# Patient Record
Sex: FEMALE | Race: WHITE | Employment: FULL TIME | URBAN - METROPOLITAN AREA
[De-identification: names, ages, dates, MRNs, and addresses within clinical notes are randomized per-mention and may not be internally consistent; named-entity substitution may affect disease eponyms.]

---

## 2017-02-28 PROBLEM — G25.81 RLS (RESTLESS LEGS SYNDROME): Status: ACTIVE | Noted: 2017-02-28

## 2017-07-19 ENCOUNTER — HOSPITAL ENCOUNTER (OUTPATIENT)
Dept: MAMMOGRAPHY | Age: 52
Discharge: HOME | End: 2017-07-19
Attending: FAMILY MEDICINE
Payer: COMMERCIAL

## 2017-07-19 DIAGNOSIS — Z12.31 ENCOUNTER FOR SCREENING MAMMOGRAM FOR BREAST CANCER: ICD-10-CM

## 2017-07-19 PROCEDURE — 77067 SCR MAMMO BI INCL CAD: CPT | Performed by: FAMILY MEDICINE

## 2017-08-08 ENCOUNTER — OFFICE VISIT (OUTPATIENT)
Dept: FAMILY MEDICINE CLINIC | Facility: CLINIC | Age: 52
End: 2017-08-08

## 2017-08-08 VITALS
WEIGHT: 198 LBS | RESPIRATION RATE: 14 BRPM | SYSTOLIC BLOOD PRESSURE: 118 MMHG | TEMPERATURE: 98 F | HEART RATE: 66 BPM | BODY MASS INDEX: 32 KG/M2 | DIASTOLIC BLOOD PRESSURE: 78 MMHG

## 2017-08-08 DIAGNOSIS — M25.40 JOINT SWELLING: ICD-10-CM

## 2017-08-08 DIAGNOSIS — R23.2 HOT FLASHES: ICD-10-CM

## 2017-08-08 DIAGNOSIS — N91.1 SECONDARY AMENORRHEA: ICD-10-CM

## 2017-08-08 DIAGNOSIS — G25.81 RLS (RESTLESS LEGS SYNDROME): ICD-10-CM

## 2017-08-08 DIAGNOSIS — M05.79 RHEUMATOID ARTHRITIS INVOLVING MULTIPLE SITES WITH POSITIVE RHEUMATOID FACTOR (HCC): Primary | ICD-10-CM

## 2017-08-08 DIAGNOSIS — L65.9 HAIR LOSS: ICD-10-CM

## 2017-08-08 DIAGNOSIS — M25.50 ARTHRALGIA, UNSPECIFIED JOINT: ICD-10-CM

## 2017-08-08 DIAGNOSIS — G44.209 TENSION HEADACHE: ICD-10-CM

## 2017-08-08 DIAGNOSIS — R63.5 WEIGHT GAIN: ICD-10-CM

## 2017-08-08 DIAGNOSIS — M79.671 FOOT PAIN, RIGHT: ICD-10-CM

## 2017-08-08 LAB
25-HYDROXYVITAMIN D (TOTAL): 33.7 NG/ML (ref 30–100)
ALBUMIN SERPL-MCNC: 4 G/DL (ref 3.5–4.8)
ALP LIVER SERPL-CCNC: 79 U/L (ref 41–108)
ALT SERPL-CCNC: 41 U/L (ref 14–54)
ANA SCREEN: POSITIVE
ANTI-THYROGLOBULIN: >500 U/ML (ref ?–60)
ANTI-THYROPEROXIDASE: ABNORMAL U/ML (ref ?–60)
AST SERPL-CCNC: 25 U/L (ref 15–41)
BASOPHILS # BLD AUTO: 0.04 X10(3) UL (ref 0–0.1)
BASOPHILS NFR BLD AUTO: 0.9 %
BILIRUB SERPL-MCNC: 0.5 MG/DL (ref 0.1–2)
BUN BLD-MCNC: 13 MG/DL (ref 8–20)
C-REACTIVE PROTEIN: <0.29 MG/DL (ref ?–1)
CALCIUM BLD-MCNC: 9.2 MG/DL (ref 8.3–10.3)
CENTROMERE AUTOAB: <100 AU/ML (ref ?–100)
CHLORIDE: 107 MMOL/L (ref 101–111)
CO2: 27 MMOL/L (ref 22–32)
CREAT BLD-MCNC: 0.85 MG/DL (ref 0.55–1.02)
DEPRECATED HBV CORE AB SER IA-ACNC: 65.3 NG/ML (ref 10–291)
DSDNA AUTOAB: <100 IU/ML (ref ?–100)
EOSINOPHIL # BLD AUTO: 0.15 X10(3) UL (ref 0–0.3)
EOSINOPHIL NFR BLD AUTO: 3.2 %
ERYTHROCYTE [DISTWIDTH] IN BLOOD BY AUTOMATED COUNT: 12.7 % (ref 11.5–16)
EST. AVERAGE GLUCOSE BLD GHB EST-MCNC: 105 MG/DL (ref 68–126)
ESTRADIOL: 14.4 PG/ML
FREE T4: 1.1 NG/DL (ref 0.9–1.8)
FSH: 55.1 MIU/ML
GLUCOSE BLD-MCNC: 92 MG/DL (ref 70–99)
HAV AB SERPL IA-ACNC: 352 PG/ML (ref 193–986)
HBA1C MFR BLD HPLC: 5.3 % (ref ?–5.7)
HCT VFR BLD AUTO: 41.1 % (ref 34–50)
HGB BLD-MCNC: 13.4 G/DL (ref 12–16)
HISTONE AUTOAB: <100 AU/ML (ref ?–100)
IMMATURE GRANULOCYTE COUNT: 0 X10(3) UL (ref 0–1)
IMMATURE GRANULOCYTE RATIO %: 0 %
JO-1 AUTOAB: <100 AU/ML (ref ?–100)
LH: 32.7 MIU/ML
LYMPHOCYTES # BLD AUTO: 1.39 X10(3) UL (ref 0.9–4)
LYMPHOCYTES NFR BLD AUTO: 29.6 %
M PROTEIN MFR SERPL ELPH: 7.4 G/DL (ref 6.1–8.3)
MCH RBC QN AUTO: 30.4 PG (ref 27–33.2)
MCHC RBC AUTO-ENTMCNC: 32.6 G/DL (ref 31–37)
MCV RBC AUTO: 93.2 FL (ref 81–100)
MONOCYTES # BLD AUTO: 0.36 X10(3) UL (ref 0.1–0.6)
MONOCYTES NFR BLD AUTO: 7.7 %
NEUTROPHIL ABS PRELIM: 2.75 X10 (3) UL (ref 1.3–6.7)
NEUTROPHILS # BLD AUTO: 2.75 X10(3) UL (ref 1.3–6.7)
NEUTROPHILS NFR BLD AUTO: 58.6 %
PLATELET # BLD AUTO: 300 10(3)UL (ref 150–450)
POTASSIUM SERPL-SCNC: 4.1 MMOL/L (ref 3.6–5.1)
RBC # BLD AUTO: 4.41 X10(6)UL (ref 3.8–5.1)
RED CELL DISTRIBUTION WIDTH-SD: 43.7 FL (ref 35.1–46.3)
RHEUMATOID FACT SERPL-ACNC: <10 IU/ML (ref ?–15)
RNP AUTOAB: <100 AU/ML (ref ?–100)
SCL-70 AUTOAB: <100 AU/ML (ref ?–100)
SED RATE-ML: 8 MM/HR (ref 0–25)
SM AUTOAB (SMITH): <100 AU/ML (ref ?–100)
SODIUM SERPL-SCNC: 140 MMOL/L (ref 136–144)
SSA AUTOAB: 241 AU/ML (ref ?–100)
SSB AUTOAB: <100 AU/ML (ref ?–100)
TSI SER-ACNC: 2.14 MIU/ML (ref 0.35–5.5)
WBC # BLD AUTO: 4.7 X10(3) UL (ref 4–13)

## 2017-08-08 PROCEDURE — 86376 MICROSOMAL ANTIBODY EACH: CPT | Performed by: FAMILY MEDICINE

## 2017-08-08 PROCEDURE — 82670 ASSAY OF TOTAL ESTRADIOL: CPT | Performed by: FAMILY MEDICINE

## 2017-08-08 PROCEDURE — 80050 GENERAL HEALTH PANEL: CPT | Performed by: FAMILY MEDICINE

## 2017-08-08 PROCEDURE — 36415 COLL VENOUS BLD VENIPUNCTURE: CPT | Performed by: FAMILY MEDICINE

## 2017-08-08 PROCEDURE — 82306 VITAMIN D 25 HYDROXY: CPT | Performed by: FAMILY MEDICINE

## 2017-08-08 PROCEDURE — 86800 THYROGLOBULIN ANTIBODY: CPT | Performed by: FAMILY MEDICINE

## 2017-08-08 PROCEDURE — 86431 RHEUMATOID FACTOR QUANT: CPT | Performed by: FAMILY MEDICINE

## 2017-08-08 PROCEDURE — 86200 CCP ANTIBODY: CPT | Performed by: FAMILY MEDICINE

## 2017-08-08 PROCEDURE — 86038 ANTINUCLEAR ANTIBODIES: CPT | Performed by: FAMILY MEDICINE

## 2017-08-08 PROCEDURE — 83036 HEMOGLOBIN GLYCOSYLATED A1C: CPT | Performed by: FAMILY MEDICINE

## 2017-08-08 PROCEDURE — 83002 ASSAY OF GONADOTROPIN (LH): CPT | Performed by: FAMILY MEDICINE

## 2017-08-08 PROCEDURE — 86140 C-REACTIVE PROTEIN: CPT | Performed by: FAMILY MEDICINE

## 2017-08-08 PROCEDURE — 82728 ASSAY OF FERRITIN: CPT | Performed by: FAMILY MEDICINE

## 2017-08-08 PROCEDURE — 83001 ASSAY OF GONADOTROPIN (FSH): CPT | Performed by: FAMILY MEDICINE

## 2017-08-08 PROCEDURE — 84439 ASSAY OF FREE THYROXINE: CPT | Performed by: FAMILY MEDICINE

## 2017-08-08 PROCEDURE — 82607 VITAMIN B-12: CPT | Performed by: FAMILY MEDICINE

## 2017-08-08 PROCEDURE — 86235 NUCLEAR ANTIGEN ANTIBODY: CPT | Performed by: FAMILY MEDICINE

## 2017-08-08 PROCEDURE — 99215 OFFICE O/P EST HI 40 MIN: CPT | Performed by: FAMILY MEDICINE

## 2017-08-08 PROCEDURE — 85652 RBC SED RATE AUTOMATED: CPT | Performed by: FAMILY MEDICINE

## 2017-08-08 PROCEDURE — 86225 DNA ANTIBODY NATIVE: CPT | Performed by: FAMILY MEDICINE

## 2017-08-08 NOTE — PROGRESS NOTES
Lukas Damon is a 46year old female. HPI:   Pt is here with multiple concerns, feels like a \"hot mess. \"    She has a continuation of a lot of the same problems she's had over the past year.     She is noting increasing swelling and unexpected weight ga Prescriptions:  RaNITidine HCl 150 MG Oral Tab Take 1 tablet (150 mg total) by mouth 2 (two) times daily.  Disp: 60 tablet Rfl: 1   ROPINIROLE HCL 2 MG Oral Tab TAKE 1 TABLET(2 MG) BY MOUTH EVERY NIGHT Disp: 30 tablet Rfl: 0   TraZODone HCl 50 MG Oral Tab T Comment: occasionally </=7         REVIEW OF SYSTEMS:   GENERAL HEALTH: feels well otherwise  SKIN: denies any unusual skin lesions or rashes  MUSC: see HPI  RESPIRATORY: denies shortness of breath with exertion  CARDIOVASCULAR: denies chest pain on exerti W/ DIFFERENTIAL  -     GERONIMO REFLEXED ANALYTES; Future    Foot pain, right  -? Related to her RA vs gait/shoes; referred to podiatry  -     CBC WITH DIFFERENTIAL WITH PLATELET; Future  -     COMP METABOLIC PANEL (14);  Future  -     FERRITIN; Future  -     HE FERRITIN; Future  -     HEMOGLOBIN A1C; Future  -     THYROID PEROXIDASE AND THYROGLOBULIN ANTIBODIES; Future  -     ASSAY, THYROID STIM HORMONE; Future  -     VITAMIN B12; Future  -     VITAMIN D, 25-HYDROXY; Future  -     FREE T4 (FREE THYROXINE);  Future HEMOGLOBIN A1C; Future  -     THYROID PEROXIDASE AND THYROGLOBULIN ANTIBODIES; Future  -     ASSAY, THYROID STIM HORMONE; Future  -     VITAMIN B12; Future  -     VITAMIN D, 25-HYDROXY; Future  -     FREE T4 (FREE THYROXINE);  Future  -     CYCLIC CITRULLIN indicates understanding of these issues and agrees to the plan. The patient is asked to return pending results.

## 2017-08-08 NOTE — PATIENT INSTRUCTIONS
Patient education: Menopausal hormone therapy (Beyond the Basics)   Author:  Anthony Chmapion MD  Section Editors:  MD Norma Gifford MD  [place] :  Elysa Cooks, MD  Contributor Disclosures  All topics are updated as ne (See \"Patient education: Nonhormonal treatments for menopausal symptoms (Beyond the Basics)\". )  More detailed information about menopausal hormone therapy is available by subscription.  (See \"Treatment of menopausal symptoms with hormone therapy\".)  [facility] increase bone density and treat menopausal symptoms. Women with a uterus who use an estrogen patch must also take a progestin to decrease the risk of uterine cancer. (See 'Types of progestin' below.)  Estrogen pill — There are many types of estrogen pills. second hormone, progestin, minimizes this risk. (See \"Patient education: Endometrial cancer diagnosis and staging (Beyond the Basics)\". )  ? Oral progestins – One commonly prescribed progestin pill is medroxyprogesterone acetate.  Other types of synthetic p prescriber selects the individual hormones and doses, which are then made by a compounding pharmacy. Supporters of this approach claim that bioidentical hormones are safer and have fewer side effects than commercially available preparations.  However, ther that, the risk will continue to go higher if you keep taking estrogen. This is discussed in detail separately. (See \"Menopausal hormone therapy and the risk of breast cancer\". )  Osteoporotic fracture — The risk of breaking a bone at the hip or spine k[place] as hot flashes or vaginal dryness. Most experts agree that hormone therapy is safe for healthy women who have menopausal symptoms. If you decide to take hormones, you should take them for the shortest period of time possible.  Short-term use of hormones (le

## 2017-08-09 PROCEDURE — 83880 ASSAY OF NATRIURETIC PEPTIDE: CPT | Performed by: FAMILY MEDICINE

## 2017-08-10 LAB
BETA NATRIURETIC PEPTIDE: 13 PG/ML (ref 2–99)
CYCLIC CITRULLINATED PEPTIDE: 7 UNITS

## 2017-09-07 ENCOUNTER — TELEPHONE (OUTPATIENT)
Dept: FAMILY MEDICINE CLINIC | Facility: CLINIC | Age: 52
End: 2017-09-07

## 2017-09-07 NOTE — TELEPHONE ENCOUNTER
Provider faxed over a request for last office visit, 08/08/17, and labs,08/08/17. Faxed to them today. Pt has an appt there tomorrow.

## 2017-10-11 PROCEDURE — 88305 TISSUE EXAM BY PATHOLOGIST: CPT | Performed by: INTERNAL MEDICINE

## 2017-10-12 ENCOUNTER — MED REC SCAN ONLY (OUTPATIENT)
Dept: FAMILY MEDICINE CLINIC | Facility: CLINIC | Age: 52
End: 2017-10-12

## 2017-11-25 ENCOUNTER — TELEPHONE (OUTPATIENT)
Dept: FAMILY MEDICINE CLINIC | Facility: CLINIC | Age: 52
End: 2017-11-25

## 2017-11-25 NOTE — TELEPHONE ENCOUNTER
To [address], patient would like to have the estrogen patch sent to [facility].

## 2017-11-25 NOTE — TELEPHONE ENCOUNTER
Patient discussed going on the estrogen patch with Dr Treasure Mary via Email. Patient would like to start this today as she wants to have a couple of days on it before she goes back to work on Monday. Can one of the other MDs order this for her?     ETHEL IN

## 2018-01-04 ENCOUNTER — HOSPITAL ENCOUNTER (OUTPATIENT)
Dept: ULTRASOUND IMAGING | Facility: HOSPITAL | Age: 53
Discharge: HOME | End: 2018-01-04
Attending: INTERNAL MEDICINE
Payer: COMMERCIAL

## 2018-01-04 ENCOUNTER — LAB ENCOUNTER (OUTPATIENT)
Dept: LAB | Facility: HOSPITAL | Age: 53
End: 2018-01-04
Attending: INTERNAL MEDICINE
Payer: COMMERCIAL

## 2018-01-04 DIAGNOSIS — R22.42 FOOT MASS, LEFT: ICD-10-CM

## 2018-01-04 DIAGNOSIS — M35.00 SJOGREN'S SYNDROME (HCC): Primary | ICD-10-CM

## 2018-01-04 LAB
C-REACTIVE PROTEIN: 0.33 MG/DL (ref ?–1)
FREE T4: 0.9 NG/DL (ref 0.9–1.8)
SED RATE-ML: 7 MM/HR (ref 0–25)
T3FREE SERPL-MCNC: 2.52 PG/ML (ref 2.3–4.2)
TSI SER-ACNC: 2.05 MIU/ML (ref 0.35–5.5)

## 2018-01-04 PROCEDURE — 85652 RBC SED RATE AUTOMATED: CPT

## 2018-01-04 PROCEDURE — 84481 FREE ASSAY (FT-3): CPT

## 2018-01-04 PROCEDURE — 84439 ASSAY OF FREE THYROXINE: CPT

## 2018-01-04 PROCEDURE — 86140 C-REACTIVE PROTEIN: CPT

## 2018-01-04 PROCEDURE — 36415 COLL VENOUS BLD VENIPUNCTURE: CPT

## 2018-01-04 PROCEDURE — 76882 US LMTD JT/FCL EVL NVASC XTR: CPT | Performed by: INTERNAL MEDICINE

## 2018-01-04 PROCEDURE — 84443 ASSAY THYROID STIM HORMONE: CPT

## 2018-01-05 ENCOUNTER — TELEPHONE (OUTPATIENT)
Dept: FAMILY MEDICINE CLINIC | Facility: CLINIC | Age: 53
End: 2018-01-05

## 2018-01-05 NOTE — TELEPHONE ENCOUNTER
Spoke with the pt and advised of the recommendations and advised to bring a copy of the disc of the exam to her appt- she states that she doesn't have them- advised that I will have the rad tech make this for her and she can pick it up tomorrow or Monday-

## 2018-01-05 NOTE — TELEPHONE ENCOUNTER
Rheumalogist advised that soft tissue would need to be removed. Can [address] recommend foot surgeon?

## 2018-01-05 NOTE — TELEPHONE ENCOUNTER
Dr Elen Blanco and Mary Lubin and [address]  [address] [facility], [address]  [place], [address]  [phone] fax    Dr. Keke Roque, [address] [facility]  [address] [address] [facility], [address]  Phone:

## 2018-01-22 ENCOUNTER — MED REC SCAN ONLY (OUTPATIENT)
Dept: FAMILY MEDICINE CLINIC | Facility: CLINIC | Age: 53
End: 2018-01-22

## 2018-02-05 ENCOUNTER — TELEPHONE (OUTPATIENT)
Dept: FAMILY MEDICINE CLINIC | Facility: CLINIC | Age: 53
End: 2018-02-05

## 2018-03-05 RX ORDER — ESTRADIOL 0.5 MG/1
TABLET ORAL
Qty: 30 TABLET | Refills: 6 | Status: SHIPPED | OUTPATIENT
Start: 2018-03-05 | End: 2020-09-15

## 2018-03-05 NOTE — TELEPHONE ENCOUNTER
Last refills -   Estradiol - 1/3/18 - #30 with 1 refill  Progesterone - 1/3/18 - #30 with 1 refill  Last office visit- 8/8/17

## 2018-04-08 ENCOUNTER — CHARTING TRANS (OUTPATIENT)
Dept: OTHER | Age: 53
End: 2018-04-08

## 2018-04-19 ENCOUNTER — OFFICE VISIT (OUTPATIENT)
Dept: FAMILY MEDICINE CLINIC | Facility: CLINIC | Age: 53
End: 2018-04-19

## 2018-04-19 VITALS
RESPIRATION RATE: 18 BRPM | HEART RATE: 63 BPM | TEMPERATURE: 98 F | DIASTOLIC BLOOD PRESSURE: 56 MMHG | OXYGEN SATURATION: 98 % | SYSTOLIC BLOOD PRESSURE: 108 MMHG

## 2018-04-19 DIAGNOSIS — J30.2 SEASONAL ALLERGIC RHINITIS, UNSPECIFIED TRIGGER: Primary | ICD-10-CM

## 2018-04-19 DIAGNOSIS — J32.4 PANSINUSITIS, UNSPECIFIED CHRONICITY: ICD-10-CM

## 2018-04-19 PROCEDURE — 99213 OFFICE O/P EST LOW 20 MIN: CPT | Performed by: NURSE PRACTITIONER

## 2018-04-19 RX ORDER — METHYLPREDNISOLONE 4 MG/1
TABLET ORAL
Qty: 1 KIT | Refills: 0 | Status: SHIPPED | OUTPATIENT
Start: 2018-04-19 | End: 2018-05-30 | Stop reason: ALTCHOICE

## 2018-04-19 RX ORDER — DOXYCYCLINE HYCLATE 100 MG
100 TABLET ORAL 2 TIMES DAILY
Qty: 20 TABLET | Refills: 0 | Status: SHIPPED | OUTPATIENT
Start: 2018-04-19 | End: 2018-04-29

## 2018-04-19 NOTE — PROGRESS NOTES
CHIEF COMPLAINT:   Patient presents with:  Sinus Problem      HPI:   Claudine Romo is a 46year old female who presents for cold/runny nose for  3  weeks. Seen about 11/12 days ago at other wic and dx with sinus infection.  Given augmentin for sinus in omega-3 fatty acids 1000 MG Oral Cap Take 1,000 mg by mouth daily. Disp:  Rfl:    Ibuprofen (ADVIL) 200 MG Oral Cap Take 400 mg by mouth 3 (three) times daily as needed.  Disp:  Rfl:       Past Medical History:   Diagnosis Date   • Cancer ([facility] [address]     cancerou Comment: occasionally </=7        REVIEW OF SYSTEMS:   GENERAL:  denies diminished appetite  SKIN: no rashes or abnormal skin lesions  HEENT: See HPI.     LUNGS: denies shortness of breath or wheezing, See HPI  CARDIOVASCULAR: denies chest pain or palpit PLAN: Discussed with patient symptoms sound likely allergy component. Switch flonase to night, start steroids, if improvement in 2-3 days continue course, if no improvement then start abx for secondary sinus infection. Meds as below.   Comfort care instruc ? Use allergy-proof filters for air conditioners and furnaces. Follow product maker's instructions for maintaining and replacing filters. ? If you can, replace tvek-of-pzwj carpets with wood, tile, or linoleum floors.  This is especially important in the b ? Use air conditioning instead of opening the windows in your home or car. In the car, choose the setting to recirculate the air, so less pollen gets in.  ?[employer] someone else do yardwork, if possible.   ? Change clothes in a mudroom when you get home if you · An expectorant containing guaifenesin may help thin the mucus and promote drainage from the sinuses. · Over-the-counter decongestants may be used unless a similar medicine was prescribed.  Nasal sprays work the fastest. Use one that contains phenylephrin © 4714-8847 [employer] [address]. [address] [facility] [place]. All rights reserved. This information is not intended as a substitute for professional medical care. Always follow your healthcare professional's instructions.

## 2018-04-19 NOTE — PATIENT INSTRUCTIONS
Controlling Asthma Triggers: Allergens  For many people with lung problems such as asthma or COPD, inhaling allergens leads to inflamed airways. Allergens also cause other types of reactions in some people.  For example, a runny nose, itchy, watery eyes, Mold grows in damp places, such as bathrooms, basements, and closets. It can grow anywhere flooding or a fire has caused water damage. Mold can live behind the walls if there has been water damage. ? Clean damp areas weekly to prevent mold growth.  This in The sinuses are air-filled spaces within the bones of the face. They connect to the inside of the nose. Sinusitis is an inflammation of the tissue lining the sinus cavity.  Sinus inflammation can occur during a cold. It can also be due to allergies to polle · Use acetaminophen or ibuprofen to control pain, unless another pain medicine was prescribed. (If you have chronic liver or kidney disease or ever had a stomach ulcer, talk with your doctor before using these medicines.  Aspirin should never be used in any

## 2018-05-30 ENCOUNTER — OFFICE VISIT (OUTPATIENT)
Dept: FAMILY MEDICINE CLINIC | Facility: CLINIC | Age: 53
End: 2018-05-30

## 2018-05-30 ENCOUNTER — TELEPHONE (OUTPATIENT)
Dept: FAMILY MEDICINE CLINIC | Facility: CLINIC | Age: 53
End: 2018-05-30

## 2018-05-30 VITALS
DIASTOLIC BLOOD PRESSURE: 64 MMHG | BODY MASS INDEX: 33.11 KG/M2 | RESPIRATION RATE: 16 BRPM | HEIGHT: 66 IN | HEART RATE: 64 BPM | WEIGHT: 206 LBS | TEMPERATURE: 98 F | SYSTOLIC BLOOD PRESSURE: 110 MMHG

## 2018-05-30 DIAGNOSIS — R22.1 LOCALIZED SWELLING, MASS AND LUMP, NECK: ICD-10-CM

## 2018-05-30 DIAGNOSIS — M21.622 TAILOR'S BUNION OF LEFT FOOT: Primary | ICD-10-CM

## 2018-05-30 DIAGNOSIS — M67.472 GANGLION CYST OF LEFT FOOT: ICD-10-CM

## 2018-05-30 DIAGNOSIS — Z01.818 PRE-OP EVALUATION: ICD-10-CM

## 2018-05-30 DIAGNOSIS — M35.00 SJOGREN'S SYNDROME WITHOUT EXTRAGLANDULAR INVOLVEMENT (HCC): ICD-10-CM

## 2018-05-30 DIAGNOSIS — R19.04 ABDOMINAL SWELLING, LLQ: ICD-10-CM

## 2018-05-30 DIAGNOSIS — Z91.09 SENSITIVITY TO SUNLIGHT: ICD-10-CM

## 2018-05-30 PROCEDURE — 99244 OFF/OP CNSLTJ NEW/EST MOD 40: CPT | Performed by: FAMILY MEDICINE

## 2018-05-30 RX ORDER — OMEPRAZOLE 20 MG/1
40 TABLET, DELAYED RELEASE ORAL DAILY
COMMUNITY
End: 2018-07-13

## 2018-05-30 RX ORDER — FLUTICASONE PROPIONATE 50 MCG
1 SPRAY, SUSPENSION (ML) NASAL AS NEEDED
Refills: 0 | COMMUNITY
Start: 2018-04-08 | End: 2021-04-01

## 2018-05-31 ENCOUNTER — TELEPHONE (OUTPATIENT)
Dept: FAMILY MEDICINE CLINIC | Facility: CLINIC | Age: 53
End: 2018-05-31

## 2018-05-31 NOTE — H&P
Heide Juarez is a 46year old female who presents for a pre-operative physical exam and clearance requested by her surgeon.  Patient is to have L 5th metatarsal bunionectomy with possible screw fixation and ganglion cyst excision, to be done by Dr. Milka Claire COLONOSCOPY, POSSIBLE BIOPSY,                POSSIBLE POLYPECTOMY 73097;  Surgeon: Frida Mendoza MD;  Location: [facility]  10/11/2017: COLONOSCOPY N/A      Comment: Procedure: COLONOSCOPY, POSSIBLE BIOPSY,                POSSIBLE POLYPECT sensitive this year so far)  EYES:denies blurred vision or double vision  HEENT: denies nasal congestion, sinus pain or ST  LUNGS: denies shortness of breath with exertion  CARDIOVASCULAR: denies chest pain on exertion  GI: denies abdominal pain,denies hea Pt's medical conditions are optimally managed for upcoming proposed surgery.      Regarding pt's new concerns today we discussed meds that could make he rmore sun sensitive (likely the plaquenil) and to be vigilant with coveringup/sunscreen; d/w pt the rig

## 2018-05-31 NOTE — TELEPHONE ENCOUNTER
----- Message from Nacho Jones MD sent at 5/30/2018  7:50 PM CDT -----  Regarding: pre op  Please send H&P along with pre-op testing to surgeon (papers in bernard's file folder on back desk) thanks

## 2018-06-15 ENCOUNTER — MED REC SCAN ONLY (OUTPATIENT)
Dept: FAMILY MEDICINE CLINIC | Facility: CLINIC | Age: 53
End: 2018-06-15

## 2018-07-13 DIAGNOSIS — K21.00 GERD WITH ESOPHAGITIS: ICD-10-CM

## 2018-07-13 RX ORDER — OMEPRAZOLE 20 MG/1
20 CAPSULE, DELAYED RELEASE ORAL
Qty: 30 CAPSULE | Refills: 9 | Status: SHIPPED | OUTPATIENT
Start: 2018-07-13 | End: 2020-09-15

## 2018-07-20 ENCOUNTER — TELEPHONE (OUTPATIENT)
Dept: FAMILY MEDICINE CLINIC | Facility: CLINIC | Age: 53
End: 2018-07-20

## 2018-07-20 NOTE — TELEPHONE ENCOUNTER
I wouldn't worry about imaging now, I'd meet with surgeon first (they may not need imaging), I don't recall who did her hernia surgery before, but could meet with that person, or if that person ont available or around I rec Dr. Corrine Fernandez  [phone]

## 2018-07-20 NOTE — TELEPHONE ENCOUNTER
Spoke with the pt and advised no need to do the imaging- gave her the names and numbers to Dr. Edda Lovell and Dr. Soniya Stevenson. She tells me that Dr. Angel Luis Calhoun did her mesh surgery previously.   She is going to talk with her spouse and make a decision and who she wants t

## 2018-07-20 NOTE — TELEPHONE ENCOUNTER
Pt wondering if she should  Have s scan of her \"stomach bulge\" and if [address] would have a recommendation for specialist.

## 2018-08-14 ENCOUNTER — OFFICE VISIT (OUTPATIENT)
Dept: SURGERY | Facility: CLINIC | Age: 53
End: 2018-08-14
Payer: COMMERCIAL

## 2018-08-14 VITALS
TEMPERATURE: 99 F | SYSTOLIC BLOOD PRESSURE: 106 MMHG | BODY MASS INDEX: 33.11 KG/M2 | HEART RATE: 70 BPM | DIASTOLIC BLOOD PRESSURE: 72 MMHG | HEIGHT: 66 IN | WEIGHT: 206 LBS

## 2018-08-14 DIAGNOSIS — R10.31 RIGHT LOWER QUADRANT ABDOMINAL PAIN: Primary | ICD-10-CM

## 2018-08-14 PROCEDURE — 99243 OFF/OP CNSLTJ NEW/EST LOW 30: CPT | Performed by: SURGERY

## 2018-08-14 NOTE — H&P
New Patient Visit Note       Active Problems      No diagnosis found.     Chief Complaint   Patient presents with:  Consult: HAD COLON resection and then had hernia repair with mesh and now one side of incision buldges out, at times gets hard and is painful family history and social history have been reviewed by me today.     Family History   Problem Relation Age of Onset   • Other [OTHER] Sister      RA   • Other [OTHER] Mother      RA   • Cancer Maternal Aunt      great aunt breast cancer   • Breast Cancer M multivitamin Oral Tab Take 1 tablet by mouth daily. Disp:  Rfl:    omega-3 fatty acids 1000 MG Oral Cap Take 1,000 mg by mouth daily. Disp:  Rfl:          Review of Systems  The Review of Systems has been reviewed by me during today.   Review of Systems deficits. Psychiatric. Normal mood and appropriate affect. Skin. No jaundice. Normal skin turgor. No dominant lesions noted. Lymphatic. No cervical, supraclavicular, or inguinal lymphadenopathy.           Assessment   Asymmetry of the abdominal wall

## 2018-11-01 VITALS
WEIGHT: 190 LBS | TEMPERATURE: 98 F | OXYGEN SATURATION: 98 % | HEART RATE: 78 BPM | BODY MASS INDEX: 30.53 KG/M2 | HEIGHT: 66 IN | RESPIRATION RATE: 18 BRPM

## 2018-11-11 ENCOUNTER — OFFICE VISIT (OUTPATIENT)
Dept: FAMILY MEDICINE CLINIC | Facility: CLINIC | Age: 53
End: 2018-11-11
Payer: COMMERCIAL

## 2018-11-11 VITALS
HEART RATE: 75 BPM | HEIGHT: 66 IN | BODY MASS INDEX: 32.14 KG/M2 | WEIGHT: 200 LBS | TEMPERATURE: 98 F | OXYGEN SATURATION: 98 % | DIASTOLIC BLOOD PRESSURE: 72 MMHG | RESPIRATION RATE: 16 BRPM | SYSTOLIC BLOOD PRESSURE: 112 MMHG

## 2018-11-11 DIAGNOSIS — J01.00 ACUTE NON-RECURRENT MAXILLARY SINUSITIS: Primary | ICD-10-CM

## 2018-11-11 DIAGNOSIS — Z23 NEED FOR VACCINATION: ICD-10-CM

## 2018-11-11 PROCEDURE — 99213 OFFICE O/P EST LOW 20 MIN: CPT | Performed by: NURSE PRACTITIONER

## 2018-11-11 PROCEDURE — 90471 IMMUNIZATION ADMIN: CPT | Performed by: NURSE PRACTITIONER

## 2018-11-11 PROCEDURE — 90686 IIV4 VACC NO PRSV 0.5 ML IM: CPT | Performed by: NURSE PRACTITIONER

## 2018-11-11 RX ORDER — AMOXICILLIN AND CLAVULANATE POTASSIUM 875; 125 MG/1; MG/1
1 TABLET, FILM COATED ORAL 2 TIMES DAILY
Qty: 20 TABLET | Refills: 0 | Status: SHIPPED | OUTPATIENT
Start: 2018-11-11 | End: 2018-11-21

## 2018-11-11 NOTE — PROGRESS NOTES
CHIEF COMPLAINT:   Patient presents with:  Ear Pain: bilateral, pain and pressure - x2 days      HPI:   Hakeem Caicedo is a 48year old female who presents to clinic today with complaints of bilat ear pressure. Has had for 2  days.  Pain is described as Use      Smoking status: Former Smoker        Years: 1.00        Types: Cigarettes        Quit date: 2009        Years since quittin.8      Smokeless tobacco: Never Used      Tobacco comment: 1 pack a week     Alcohol use:  Yes      Alcohol/week: 0. Risk and benefits of medication discussed. If antibiotics prescribed, stressed importance of completing full course of antibiotic. Acetaminophen or NSAID prn pain.       Follow up with PCP if s/sx worsen, do not begin to improve in 3 days, or

## 2018-11-11 NOTE — PATIENT INSTRUCTIONS
Use Advil cold and sinus from behind the pharmacy counter  Use Nyquil at bedtime  Prop yourself up at bedtime  Use the antibiotic for sinus infection

## 2019-07-16 ENCOUNTER — TELEPHONE (OUTPATIENT)
Dept: FAMILY MEDICINE CLINIC | Facility: CLINIC | Age: 54
End: 2019-07-16

## 2019-07-16 DIAGNOSIS — Z12.31 BREAST CANCER SCREENING BY MAMMOGRAM: Primary | ICD-10-CM

## 2019-07-16 NOTE — TELEPHONE ENCOUNTER
Patient would like to schedule her mammogram for this year. Need order so she can get it scheduled. Please call patient back when order is in the system.

## 2019-07-17 ENCOUNTER — HOSPITAL ENCOUNTER (OUTPATIENT)
Dept: MAMMOGRAPHY | Age: 54
Discharge: HOME | End: 2019-07-17
Attending: FAMILY MEDICINE
Payer: COMMERCIAL

## 2019-07-17 DIAGNOSIS — Z12.31 BREAST CANCER SCREENING BY MAMMOGRAM: ICD-10-CM

## 2019-07-17 PROCEDURE — 77067 SCR MAMMO BI INCL CAD: CPT | Performed by: FAMILY MEDICINE

## 2019-11-04 ENCOUNTER — OFFICE VISIT (OUTPATIENT)
Dept: FAMILY MEDICINE CLINIC | Facility: CLINIC | Age: 54
End: 2019-11-04
Payer: COMMERCIAL

## 2019-11-04 VITALS
WEIGHT: 200 LBS | SYSTOLIC BLOOD PRESSURE: 128 MMHG | HEIGHT: 66 IN | TEMPERATURE: 98 F | RESPIRATION RATE: 18 BRPM | BODY MASS INDEX: 32.14 KG/M2 | HEART RATE: 71 BPM | OXYGEN SATURATION: 98 % | DIASTOLIC BLOOD PRESSURE: 82 MMHG

## 2019-11-04 DIAGNOSIS — J01.40 ACUTE NON-RECURRENT PANSINUSITIS: Primary | ICD-10-CM

## 2019-11-04 PROCEDURE — 99213 OFFICE O/P EST LOW 20 MIN: CPT | Performed by: PHYSICIAN ASSISTANT

## 2019-11-04 RX ORDER — AMOXICILLIN AND CLAVULANATE POTASSIUM 875; 125 MG/1; MG/1
1 TABLET, FILM COATED ORAL 2 TIMES DAILY
Qty: 20 TABLET | Refills: 0 | Status: SHIPPED | OUTPATIENT
Start: 2019-11-04 | End: 2019-11-14

## 2019-11-04 NOTE — PROGRESS NOTES
CHIEF COMPLAINT:   Patient presents with:  Sinus Problem: sinus pain and pressure x 3 weeks     HPI:   Duane Beer is a 47year old female who presents for sinus congestion for 3 weeks. Symptoms have been constant since onset.  Sinus congestion/pain is [address] N/A 10/9/2017    Performed by Lydia Dotson MD at [facility]   • COLONOSCOPY, POSSIBLE BIOPSY, POSSIBLE POLYPECTOMY 33531 N/A 6/5/2015    Performed by Lydia Dotson MD at [facility]   • ESOPHAGOGASTRODUODENOSCOPY, POSSIBLE BIOPSY, POSSI 18   Ht 66\"   Wt 200 lb (90.7 kg)   SpO2 98%   BMI 32.28 kg/m²   GENERAL: well developed, well nourished, in no apparent distress  HEAD: atraumatic, normocephalic,  ++ tenderness on palpation of maxillary and frontal sinuses  EYES: conjunctiva clear  EARS help with nasal/sinus congestion. Do not use Afrin for longer than 3 days. (People with high blood pressure should not use decongestants. They can raise blood pressure.)  Over the counter saline nasal spray may also help with congestion.   · An expectoran 4-8 oz twice daily. · Probiotics: Capsule/granule forms such as Florastor, Florajen (refrigerated), or Culturelle.       When to seek medical advice  Call your healthcare provider if any of these occur:  · Facial pain or headache that gets worse  · Stif
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
## 2020-08-13 ENCOUNTER — TELEPHONE (OUTPATIENT)
Dept: FAMILY MEDICINE CLINIC | Facility: CLINIC | Age: 55
End: 2020-08-13

## 2020-08-13 DIAGNOSIS — Z12.31 ENCOUNTER FOR SCREENING MAMMOGRAM FOR MALIGNANT NEOPLASM OF BREAST: Primary | ICD-10-CM

## 2020-08-13 DIAGNOSIS — R53.83 FATIGUE, UNSPECIFIED TYPE: ICD-10-CM

## 2020-08-13 DIAGNOSIS — R50.9 FEVER, UNSPECIFIED FEVER CAUSE: ICD-10-CM

## 2020-08-13 NOTE — TELEPHONE ENCOUNTER
Pt called, was instructed by a  in [place] to let her PCP know that she had been stung by 3 bees in a month and a half and each sting got worse.   Please call pt at [phone]

## 2020-08-13 NOTE — TELEPHONE ENCOUNTER
Spoke with the pt and she had been stung by a bee around the breast area- swelled up and burning- but went down pretty quickly    2nd time she got stung on Right forearm- swelled up immediately and pretty bad- she states that she could hardly move her wris

## 2020-08-14 ENCOUNTER — HOSPITAL ENCOUNTER (OUTPATIENT)
Age: 55
Discharge: HOME | End: 2020-08-14
Payer: COMMERCIAL

## 2020-08-14 VITALS
BODY MASS INDEX: 32 KG/M2 | SYSTOLIC BLOOD PRESSURE: 132 MMHG | TEMPERATURE: 97 F | DIASTOLIC BLOOD PRESSURE: 89 MMHG | OXYGEN SATURATION: 98 % | WEIGHT: 200 LBS | RESPIRATION RATE: 16 BRPM | HEART RATE: 97 BPM

## 2020-08-14 DIAGNOSIS — R05.9 COUGH: Primary | ICD-10-CM

## 2020-08-14 DIAGNOSIS — B34.9 VIRAL SYNDROME: ICD-10-CM

## 2020-08-14 PROCEDURE — 99213 OFFICE O/P EST LOW 20 MIN: CPT | Performed by: NURSE PRACTITIONER

## 2020-08-14 PROCEDURE — U0003 INFECTIOUS AGENT DETECTION BY NUCLEIC ACID (DNA OR RNA); SEVERE ACUTE RESPIRATORY SYNDROME CORONAVIRUS 2 (SARS-COV-2) (CORONAVIRUS DISEASE [COVID-19]), AMPLIFIED PROBE TECHNIQUE, MAKING USE OF HIGH THROUGHPUT TECHNOLOGIES AS DESCRIBED BY CMS-2020-01-R: HCPCS | Performed by: NURSE PRACTITIONER

## 2020-08-14 RX ORDER — EPINEPHRINE 0.3 MG/.3ML
0.3 INJECTION SUBCUTANEOUS ONCE
Qty: 1 EACH | Refills: 1 | Status: SHIPPED | OUTPATIENT
Start: 2020-08-14 | End: 2020-08-14

## 2020-08-14 NOTE — TELEPHONE ENCOUNTER
Spoke to pt, stated she started to get a fever of 100.8 last night and feeling tired now and some dizziness. Does not have a fever this morning. Wanted to go to walk-in to [facility] and rule out ovid because she stated she might have been exposed a week ago.

## 2020-08-14 NOTE — ED PROVIDER NOTES
Patient Seen in: [id number] [facility]      History   Patient presents with:  Cough/URI    Stated Complaint: sinus congestion/fatigue    29-year-old female who presents to the immediate care with complaints of sinus congestion/pressure generali (guestimate)    colon resection--\"intestine was nicked\" during initial surgery, had to go back in to repair a few days later   • OTHER SURGICAL HISTORY      hernia repair with mesh (a few years after colon resection surgeries)                Family histo Nose: Right sinus exhibits maxillary sinus tenderness and frontal sinus tenderness. Left sinus exhibits maxillary sinus tenderness and frontal sinus tenderness.    Mouth/Throat: Uvula is midline and mucous membranes are normal. Posterior oropharyngeal bess syndrome    Disposition:  Discharge  8/14/2020 10:38 am    Follow-up:  Carlene Cedeno MD  [address] [place] [address],[place] [address]  [phone]                Medications Prescribed:  Discharge Medication List as of 8/14/2020 10:41 AM

## 2020-08-14 NOTE — TELEPHONE ENCOUNTER
mammo order placed.    I  Would keep epi pen on hand, fine to send too pharmacy, pharmacist can demonstrate how to use if needed for signs of  Anaphylaxis (throat closing, wheezing, trouble breathing, difuse hives, diaphoresis, nausea, vomiting, heart racin

## 2020-08-17 LAB — SARS-COV-2 BY PCR: NOT DETECTED

## 2020-08-18 NOTE — PROGRESS NOTES
Notified patient of negative Covid 19 test results from 8/14/20. Instructed patient to return call with any questions or concerns.

## 2020-09-15 ENCOUNTER — OFFICE VISIT (OUTPATIENT)
Dept: FAMILY MEDICINE CLINIC | Facility: CLINIC | Age: 55
End: 2020-09-15
Payer: COMMERCIAL

## 2020-09-15 VITALS
HEIGHT: 66 IN | DIASTOLIC BLOOD PRESSURE: 80 MMHG | WEIGHT: 193 LBS | BODY MASS INDEX: 31.02 KG/M2 | TEMPERATURE: 98 F | RESPIRATION RATE: 18 BRPM | SYSTOLIC BLOOD PRESSURE: 126 MMHG | HEART RATE: 88 BPM | OXYGEN SATURATION: 98 %

## 2020-09-15 DIAGNOSIS — Z82.61 FAMILY HISTORY OF RHEUMATOID ARTHRITIS: ICD-10-CM

## 2020-09-15 DIAGNOSIS — Z91.018 FOOD ALLERGY: ICD-10-CM

## 2020-09-15 DIAGNOSIS — Z86.19 HISTORY OF SHINGLES: ICD-10-CM

## 2020-09-15 DIAGNOSIS — R53.82 CHRONIC FATIGUE: ICD-10-CM

## 2020-09-15 DIAGNOSIS — M35.00 SJOGREN'S SYNDROME WITHOUT EXTRAGLANDULAR INVOLVEMENT (HCC): ICD-10-CM

## 2020-09-15 DIAGNOSIS — G25.81 RLS (RESTLESS LEGS SYNDROME): ICD-10-CM

## 2020-09-15 DIAGNOSIS — Z23 NEED FOR VACCINATION: ICD-10-CM

## 2020-09-15 DIAGNOSIS — Z12.4 CERVICAL CANCER SCREENING: ICD-10-CM

## 2020-09-15 DIAGNOSIS — Z00.00 ROUTINE HISTORY AND PHYSICAL EXAMINATION OF ADULT: Primary | ICD-10-CM

## 2020-09-15 DIAGNOSIS — Z12.31 SCREENING MAMMOGRAM, ENCOUNTER FOR: ICD-10-CM

## 2020-09-15 DIAGNOSIS — K59.09 CHRONIC CONSTIPATION: ICD-10-CM

## 2020-09-15 DIAGNOSIS — J30.89 SEASONAL ALLERGIC RHINITIS DUE TO OTHER ALLERGIC TRIGGER: ICD-10-CM

## 2020-09-15 LAB
ALBUMIN SERPL-MCNC: 4.1 G/DL (ref 3.4–5)
ALBUMIN/GLOB SERPL: 1.2 {RATIO} (ref 1–2)
ALP LIVER SERPL-CCNC: 102 U/L (ref 41–108)
ALT SERPL-CCNC: 25 U/L (ref 13–56)
ANION GAP SERPL CALC-SCNC: 4 MMOL/L (ref 0–18)
AST SERPL-CCNC: 18 U/L (ref 15–37)
BASOPHILS # BLD AUTO: 0.04 X10(3) UL (ref 0–0.2)
BASOPHILS NFR BLD AUTO: 0.5 %
BILIRUB SERPL-MCNC: 0.5 MG/DL (ref 0.1–2)
BUN BLD-MCNC: 9 MG/DL (ref 7–18)
BUN/CREAT SERPL: 10.2 (ref 10–20)
CALCIUM BLD-MCNC: 9.7 MG/DL (ref 8.5–10.1)
CHLORIDE SERPL-SCNC: 103 MMOL/L (ref 98–112)
CHOLEST SMN-MCNC: 195 MG/DL (ref ?–200)
CO2 SERPL-SCNC: 33 MMOL/L (ref 21–32)
CREAT BLD-MCNC: 0.88 MG/DL (ref 0.55–1.02)
CRP SERPL-MCNC: 0.92 MG/DL (ref ?–0.3)
DEPRECATED HBV CORE AB SER IA-ACNC: 56.3 NG/ML (ref 18–340)
DEPRECATED RDW RBC AUTO: 42.2 FL (ref 35.1–46.3)
EOSINOPHIL # BLD AUTO: 0.11 X10(3) UL (ref 0–0.7)
EOSINOPHIL NFR BLD AUTO: 1.4 %
ERYTHROCYTE [DISTWIDTH] IN BLOOD BY AUTOMATED COUNT: 12.3 % (ref 11–15)
GLOBULIN PLAS-MCNC: 3.4 G/DL (ref 2.8–4.4)
GLUCOSE BLD-MCNC: 93 MG/DL (ref 70–99)
HAV IGM SER QL: 2.2 MG/DL (ref 1.6–2.6)
HCT VFR BLD AUTO: 42.3 % (ref 35–48)
HDLC SERPL-MCNC: 42 MG/DL (ref 40–59)
HGB BLD-MCNC: 13.4 G/DL (ref 12–16)
IMM GRANULOCYTES # BLD AUTO: 0.03 X10(3) UL (ref 0–1)
IMM GRANULOCYTES NFR BLD: 0.4 %
LDLC SERPL CALC-MCNC: 128 MG/DL (ref ?–100)
LYMPHOCYTES # BLD AUTO: 2 X10(3) UL (ref 1–4)
LYMPHOCYTES NFR BLD AUTO: 25.1 %
M PROTEIN MFR SERPL ELPH: 7.5 G/DL (ref 6.4–8.2)
MCH RBC QN AUTO: 29.4 PG (ref 26–34)
MCHC RBC AUTO-ENTMCNC: 31.7 G/DL (ref 31–37)
MCV RBC AUTO: 92.8 FL (ref 80–100)
MONOCYTES # BLD AUTO: 0.62 X10(3) UL (ref 0.1–1)
MONOCYTES NFR BLD AUTO: 7.8 %
NEUTROPHILS # BLD AUTO: 5.17 X10 (3) UL (ref 1.5–7.7)
NEUTROPHILS # BLD AUTO: 5.17 X10(3) UL (ref 1.5–7.7)
NEUTROPHILS NFR BLD AUTO: 64.8 %
NONHDLC SERPL-MCNC: 153 MG/DL (ref ?–130)
OSMOLALITY SERPL CALC.SUM OF ELEC: 288 MOSM/KG (ref 275–295)
PATIENT FASTING Y/N/NP: NO
PATIENT FASTING Y/N/NP: NO
PLATELET # BLD AUTO: 345 10(3)UL (ref 150–450)
POTASSIUM SERPL-SCNC: 4.7 MMOL/L (ref 3.5–5.1)
RBC # BLD AUTO: 4.56 X10(6)UL (ref 3.8–5.3)
RHEUMATOID FACT SERPL-ACNC: <10 IU/ML (ref ?–15)
SED RATE-ML: 9 MM/HR (ref 0–25)
SODIUM SERPL-SCNC: 140 MMOL/L (ref 136–145)
T4 FREE SERPL-MCNC: 1.2 NG/DL (ref 0.8–1.7)
TRIGL SERPL-MCNC: 124 MG/DL (ref 30–149)
TSI SER-ACNC: 2.39 MIU/ML (ref 0.36–3.74)
VIT B12 SERPL-MCNC: 425 PG/ML (ref 193–986)
VIT D+METAB SERPL-MCNC: 27.7 NG/ML (ref 30–100)
VLDLC SERPL CALC-MCNC: 25 MG/DL (ref 0–30)
WBC # BLD AUTO: 8 X10(3) UL (ref 4–11)

## 2020-09-15 PROCEDURE — 85652 RBC SED RATE AUTOMATED: CPT | Performed by: FAMILY MEDICINE

## 2020-09-15 PROCEDURE — 3008F BODY MASS INDEX DOCD: CPT | Performed by: FAMILY MEDICINE

## 2020-09-15 PROCEDURE — 3079F DIAST BP 80-89 MM HG: CPT | Performed by: FAMILY MEDICINE

## 2020-09-15 PROCEDURE — 88175 CYTOPATH C/V AUTO FLUID REDO: CPT | Performed by: FAMILY MEDICINE

## 2020-09-15 PROCEDURE — 36415 COLL VENOUS BLD VENIPUNCTURE: CPT | Performed by: FAMILY MEDICINE

## 2020-09-15 PROCEDURE — 86003 ALLG SPEC IGE CRUDE XTRC EA: CPT | Performed by: FAMILY MEDICINE

## 2020-09-15 PROCEDURE — 86140 C-REACTIVE PROTEIN: CPT | Performed by: FAMILY MEDICINE

## 2020-09-15 PROCEDURE — 82607 VITAMIN B-12: CPT | Performed by: FAMILY MEDICINE

## 2020-09-15 PROCEDURE — 87624 HPV HI-RISK TYP POOLED RSLT: CPT | Performed by: FAMILY MEDICINE

## 2020-09-15 PROCEDURE — 86200 CCP ANTIBODY: CPT | Performed by: FAMILY MEDICINE

## 2020-09-15 PROCEDURE — 86038 ANTINUCLEAR ANTIBODIES: CPT | Performed by: FAMILY MEDICINE

## 2020-09-15 PROCEDURE — 90686 IIV4 VACC NO PRSV 0.5 ML IM: CPT | Performed by: FAMILY MEDICINE

## 2020-09-15 PROCEDURE — 84480 ASSAY TRIIODOTHYRONINE (T3): CPT | Performed by: FAMILY MEDICINE

## 2020-09-15 PROCEDURE — 83735 ASSAY OF MAGNESIUM: CPT | Performed by: FAMILY MEDICINE

## 2020-09-15 PROCEDURE — 82728 ASSAY OF FERRITIN: CPT | Performed by: FAMILY MEDICINE

## 2020-09-15 PROCEDURE — 86376 MICROSOMAL ANTIBODY EACH: CPT | Performed by: FAMILY MEDICINE

## 2020-09-15 PROCEDURE — 80061 LIPID PANEL: CPT | Performed by: FAMILY MEDICINE

## 2020-09-15 PROCEDURE — 80050 GENERAL HEALTH PANEL: CPT | Performed by: FAMILY MEDICINE

## 2020-09-15 PROCEDURE — 90471 IMMUNIZATION ADMIN: CPT | Performed by: FAMILY MEDICINE

## 2020-09-15 PROCEDURE — 82306 VITAMIN D 25 HYDROXY: CPT | Performed by: FAMILY MEDICINE

## 2020-09-15 PROCEDURE — 83036 HEMOGLOBIN GLYCOSYLATED A1C: CPT | Performed by: FAMILY MEDICINE

## 2020-09-15 PROCEDURE — 90715 TDAP VACCINE 7 YRS/> IM: CPT | Performed by: FAMILY MEDICINE

## 2020-09-15 PROCEDURE — 3074F SYST BP LT 130 MM HG: CPT | Performed by: FAMILY MEDICINE

## 2020-09-15 PROCEDURE — 84439 ASSAY OF FREE THYROXINE: CPT | Performed by: FAMILY MEDICINE

## 2020-09-15 PROCEDURE — 86431 RHEUMATOID FACTOR QUANT: CPT | Performed by: FAMILY MEDICINE

## 2020-09-15 PROCEDURE — 99396 PREV VISIT EST AGE 40-64: CPT | Performed by: FAMILY MEDICINE

## 2020-09-15 PROCEDURE — 86800 THYROGLOBULIN ANTIBODY: CPT | Performed by: FAMILY MEDICINE

## 2020-09-15 RX ORDER — EPINEPHRINE 0.3 MG/.3ML
INJECTION SUBCUTANEOUS
COMMUNITY
Start: 2020-08-14

## 2020-09-15 NOTE — PROGRESS NOTES
HPI:   Amanda Villanueva is a 47year old female who presents for a complete physical exam.      Patient complains of still chronic constipation and daily miralax and lot of water keeps her going daily, so that's okay.   Feels tired all the time, thinks it's 3350 (MIRALAX OR) Take by mouth. • Artificial Tear Solution (ARTIFICIAL TEARS OP) 1 Application. • Fluticasone Propionate 50 MCG/ACT Nasal Suspension 1 spray by Nasal route as needed. 0   • multivitamin Oral Tab Take 1 tablet by mouth daily.      • status: Former Smoker        Years: 1.00        Types: Cigarettes        Quit date: 2009        Years since quittin.7      Smokeless tobacco: Never Used      Tobacco comment: non-smoker    Alcohol use:  Yes      Alcohol/week: 0.0 standard drinks are intact,motor and sensory are grossly intact    ASSESSMENT AND PLAN:   Diagnoses and all orders for this visit:    Routine history and physical examination of adult  -Our lifestyle habits account for about 80% of our health outcomes.   Current research s HIGH RISK , THIN PREP COLLECTION; Future  -     FLORES SCREENING BILAT (CPT=77067);  Future  -     CBC W/ DIFFERENTIAL    Need for vaccination  -     IMMUNIZATION ADMINISTRATION  -     EACH ADDITIONAL VACCINE  -     TETANUS, DIPHTHERIA TOXOIDS AND ACELLULAR PE (AUTOMATED); Future  -     C-REACTIVE PROTEIN; Future  -     GERONIMO, DIRECT, REFLEX TO 9 ENAS; Future  -     CYCLIC CITRULLINATE PEP. IGG; Future  -     RHEUMATOID ARTHRITIS FACTOR; Future  -     F002 MILK (COW); Future  -     F024 SHRIMP;  Future  -     F020 CYCLIC CITRULLINATE PEP. IGG; Future  -     RHEUMATOID ARTHRITIS FACTOR; Future  -     F002 MILK (COW); Future  -     F024 SHRIMP; Future  -     F020 ALMOND; Future  -     F075 EGG (YOLK);  Future  -     F001 EGG WHITE; Future  -     CBC W/ DIFFERENTIAL

## 2020-09-16 LAB
ANA SCREEN: NEGATIVE
EST. AVERAGE GLUCOSE BLD GHB EST-MCNC: 117 MG/DL (ref 68–126)
HBA1C MFR BLD HPLC: 5.7 % (ref ?–5.7)
T3 SERPL-MCNC: 87 NG/DL (ref 60–181)
THYROGLOB SERPL-MCNC: 277 U/ML (ref ?–60)
THYROPEROXIDASE AB SERPL-ACNC: 5082 U/ML (ref ?–60)

## 2020-09-17 LAB — HPV I/H RISK 1 DNA SPEC QL NAA+PROBE: NEGATIVE

## 2020-09-18 LAB
ALMOND IGE QN: 13.1 KUA/L (ref ?–0.1)
COW MILK IGE QN: <0.1 KUA/L (ref ?–0.1)
EGG WHITE IGE QN: <0.1 KUA/L (ref ?–0.1)
EGG YOLK IGE QN: <0.1 KUA/L (ref ?–0.1)
LAST PAP RESULT: NORMAL
PAP HISTORY (OTHER THAN LAST PAP): NORMAL
SHRIMP IGE QN: 3.05 KUA/L (ref ?–0.1)

## 2020-09-23 LAB — CCP IGG SERPL-ACNC: 1.4 U/ML (ref 0–6.9)

## 2020-10-15 ENCOUNTER — TELEPHONE (OUTPATIENT)
Dept: FAMILY MEDICINE CLINIC | Facility: CLINIC | Age: 55
End: 2020-10-15

## 2021-04-01 ENCOUNTER — OFFICE VISIT (OUTPATIENT)
Dept: FAMILY MEDICINE CLINIC | Facility: CLINIC | Age: 56
End: 2021-04-01
Payer: COMMERCIAL

## 2021-04-01 VITALS
DIASTOLIC BLOOD PRESSURE: 60 MMHG | SYSTOLIC BLOOD PRESSURE: 102 MMHG | BODY MASS INDEX: 30.53 KG/M2 | TEMPERATURE: 97 F | HEIGHT: 66 IN | OXYGEN SATURATION: 98 % | WEIGHT: 190 LBS | HEART RATE: 56 BPM | RESPIRATION RATE: 14 BRPM

## 2021-04-01 DIAGNOSIS — J01.00 ACUTE NON-RECURRENT MAXILLARY SINUSITIS: ICD-10-CM

## 2021-04-01 DIAGNOSIS — R09.81 NASAL CONGESTION: Primary | ICD-10-CM

## 2021-04-01 PROCEDURE — U0002 COVID-19 LAB TEST NON-CDC: HCPCS | Performed by: FAMILY MEDICINE

## 2021-04-01 PROCEDURE — 3074F SYST BP LT 130 MM HG: CPT | Performed by: FAMILY MEDICINE

## 2021-04-01 PROCEDURE — 3008F BODY MASS INDEX DOCD: CPT | Performed by: FAMILY MEDICINE

## 2021-04-01 PROCEDURE — 99213 OFFICE O/P EST LOW 20 MIN: CPT | Performed by: FAMILY MEDICINE

## 2021-04-01 PROCEDURE — 3078F DIAST BP <80 MM HG: CPT | Performed by: FAMILY MEDICINE

## 2021-04-01 RX ORDER — FLUTICASONE PROPIONATE 50 MCG
2 SPRAY, SUSPENSION (ML) NASAL DAILY
Qty: 1 BOTTLE | Refills: 1 | Status: SHIPPED | OUTPATIENT
Start: 2021-04-01

## 2021-04-01 RX ORDER — AMOXICILLIN AND CLAVULANATE POTASSIUM 875; 125 MG/1; MG/1
1 TABLET, FILM COATED ORAL 2 TIMES DAILY
Qty: 20 TABLET | Refills: 0 | Status: SHIPPED | OUTPATIENT
Start: 2021-04-01 | End: 2021-04-11

## 2021-04-01 NOTE — PATIENT INSTRUCTIONS
Sinusitis (Antibiotic Treatment)    The sinuses are air-filled spaces within the bones of the face. They connect to the inside of the nose. Sinusitis is an inflammation of the tissue that lines the sinuses. Sinusitis can occur during a cold.  It can also pressure should not use decongestants. They can raise blood pressure.) Talk with your provider or pharmacist if you have any questions about the medicine. .  · OTC antihistamines may help if allergies contributed to your sinusitis.  Talk with your provider o information is not intended as a substitute for professional medical care. Always follow your healthcare professional's instructions.       Coronavirus Disease 2019 (COVID-19)     [facility] is committed to the safety and well-being of our patie will consider include stopping quarantine  • After 14 days from date of last exposure  • After 10 days without testing from date of last exposure  • After day 7 from date of last exposure with a negative test result (test must occur on day 5 or later)  Aft like counters, tabletops, and doorknobs. Use household cleaning sprays or wipes according to the label instructions.          Seek Further Care     If you are awaiting test results or are confirmed positive for COVID -19, and your symptoms worsen at home wi not received a call within 2 business days, please call your primary care provider or check Coach[employer]hart for results. Post-Discharge Follow-up  If you are diagnosed with COVID, refrain from exercise until approved by your primary care provider.  Please call y coronavirus disease 2019, Dr. [email or web address]. pdf  Centers for Disease Control & Prevention (CDC)  10 things you can do to manage your health at home, [employer]

## 2021-04-01 NOTE — PROGRESS NOTES
Ce Grossman is a 54year old female. S:  Patient presents today with the following concerns:  · Sinus symptoms for 4 weeks. Pressure on nose from wearing mask. Headache over face. Ear pain. 99.1 temp sometimes. No fever today.   No N/V/D,  Sore 20        estimate   • Breast Cancer Paternal Grandmother 61   • Breast Cancer Paternal Cousin Female 28   • Breast Cancer Paternal Cousin Female 27   • Breast Cancer Paternal Cousin Female 36        estimates       REVIEW OF SYSTEMS:  GENERAL: feels well medications, side effects, adverse reactions. Encouraged her to start an allergy medicine 2-4 weeks before her symptoms typically start each year. This may help prevent sinusitis.     Will do a trial of Lumify for \"scratchy\" eyes likely due to sjogren

## 2021-07-19 ENCOUNTER — TELEPHONE (OUTPATIENT)
Dept: FAMILY MEDICINE CLINIC | Facility: CLINIC | Age: 56
End: 2021-07-19

## 2021-07-19 NOTE — TELEPHONE ENCOUNTER
PT CALLED AND THAT SHE IS OVER DUE FOR HER COLONOSCOPY. [facility] EXPLAINED THAT THERE PHONE LINES HAVE BEEN DOWN FOR ABOUT A WEEK.     PT LOOKING FOR OTHER RECOMMENDATIONS      THANK YOU     ALSO ADV THAT SHE HAS

## 2021-07-19 NOTE — TELEPHONE ENCOUNTER
Dr. Joe Velazquez  [address] [facility] [place], [address] [place] [address]  [phone]

## 2021-07-27 ENCOUNTER — HOSPITAL ENCOUNTER (OUTPATIENT)
Dept: MAMMOGRAPHY | Age: 56
Discharge: HOME | End: 2021-07-27
Attending: FAMILY MEDICINE
Payer: COMMERCIAL

## 2021-07-27 ENCOUNTER — OFFICE VISIT (OUTPATIENT)
Dept: SURGERY | Facility: CLINIC | Age: 56
End: 2021-07-27
Payer: COMMERCIAL

## 2021-07-27 VITALS — WEIGHT: 190 LBS | TEMPERATURE: 97 F | HEART RATE: 75 BPM | HEIGHT: 66 IN | BODY MASS INDEX: 30.53 KG/M2

## 2021-07-27 DIAGNOSIS — K21.9 GASTROESOPHAGEAL REFLUX DISEASE, UNSPECIFIED WHETHER ESOPHAGITIS PRESENT: Primary | ICD-10-CM

## 2021-07-27 DIAGNOSIS — Z00.00 ROUTINE HISTORY AND PHYSICAL EXAMINATION OF ADULT: ICD-10-CM

## 2021-07-27 DIAGNOSIS — Z12.31 SCREENING MAMMOGRAM, ENCOUNTER FOR: ICD-10-CM

## 2021-07-27 PROCEDURE — 3008F BODY MASS INDEX DOCD: CPT | Performed by: SURGERY

## 2021-07-27 PROCEDURE — 99244 OFF/OP CNSLTJ NEW/EST MOD 40: CPT | Performed by: SURGERY

## 2021-07-27 PROCEDURE — 77067 SCR MAMMO BI INCL CAD: CPT | Performed by: FAMILY MEDICINE

## 2021-07-27 RX ORDER — SODIUM, POTASSIUM,MAG SULFATES 17.5-3.13G
SOLUTION, RECONSTITUTED, ORAL ORAL
Qty: 1 EACH | Refills: 0 | Status: SHIPPED | OUTPATIENT
Start: 2021-07-27 | End: 2021-11-08 | Stop reason: ALTCHOICE

## 2021-07-27 NOTE — H&P
Soledad De La Cruz is a 54year old female  Patient presents with:  Colonoscopy: new patient referred by Dr. Shirley Rossi for colonoscopy and EGD. hx colon resection. Last colonoscopy done 2017-Dr. Cristina Hoang- hx colon polyps. c/o constipation- takes Miralax.        Ciaran Sebastian route daily. , Disp: 1 Bottle, Rfl: 1  EPINEPHrine 0.3 MG/0.3ML Injection Solution Auto-injector, INJ 0.3 ML UTD ONE TIME FOR 1 DOSE.  IF NEEDED FOR SIGNS OF ANAPHYLAXIS AFTER BEE STING, Disp: , Rfl:   Polyethylene Glycol 3350 (MIRALAX OR), Take by mouth., D nausea, vomiting, constipation, diarrhea; no rectal bleeding; no heartburn  GENITAL/: no dysuria, urgency or frequency, no tea colored urine  MUSCULOSKELETAL: no joint complaints upper or lower extremities  HEMATOLOGY: denies hx anemia; denies bruising o BOWEL PREP KIT) 17.5-3.13-1.6 GM/177ML Oral Solution 1 each 0     Sig: Take one bottle at 5pm & 9pm the night before procedure       Imaging & Consults:  None

## 2021-09-07 ENCOUNTER — LAB ENCOUNTER (OUTPATIENT)
Dept: LAB | Facility: HOSPITAL | Age: 56
End: 2021-09-07
Attending: SURGERY
Payer: COMMERCIAL

## 2021-09-07 DIAGNOSIS — Z01.818 PRE-PROCEDURAL EXAMINATION: ICD-10-CM

## 2021-09-08 LAB — SARS-COV-2 RNA RESP QL NAA+PROBE: NOT DETECTED

## 2021-09-10 ENCOUNTER — LAB REQUISITION (OUTPATIENT)
Dept: LAB | Facility: HOSPITAL | Age: 56
End: 2021-09-10
Payer: COMMERCIAL

## 2021-09-10 DIAGNOSIS — Z86.010 PERSONAL HISTORY OF COLONIC POLYPS: ICD-10-CM

## 2021-09-10 PROCEDURE — 88305 TISSUE EXAM BY PATHOLOGIST: CPT | Performed by: SURGERY

## 2021-11-08 ENCOUNTER — OFFICE VISIT (OUTPATIENT)
Dept: FAMILY MEDICINE CLINIC | Facility: CLINIC | Age: 56
End: 2021-11-08
Payer: COMMERCIAL

## 2021-11-08 VITALS
HEIGHT: 66 IN | HEART RATE: 60 BPM | TEMPERATURE: 97 F | OXYGEN SATURATION: 99 % | RESPIRATION RATE: 16 BRPM | DIASTOLIC BLOOD PRESSURE: 70 MMHG | SYSTOLIC BLOOD PRESSURE: 124 MMHG | WEIGHT: 206 LBS | BODY MASS INDEX: 33.11 KG/M2

## 2021-11-08 DIAGNOSIS — J01.90 ACUTE NON-RECURRENT SINUSITIS, UNSPECIFIED LOCATION: Primary | ICD-10-CM

## 2021-11-08 PROCEDURE — 3074F SYST BP LT 130 MM HG: CPT | Performed by: PHYSICIAN ASSISTANT

## 2021-11-08 PROCEDURE — 3078F DIAST BP <80 MM HG: CPT | Performed by: PHYSICIAN ASSISTANT

## 2021-11-08 PROCEDURE — 99213 OFFICE O/P EST LOW 20 MIN: CPT | Performed by: PHYSICIAN ASSISTANT

## 2021-11-08 PROCEDURE — 3008F BODY MASS INDEX DOCD: CPT | Performed by: PHYSICIAN ASSISTANT

## 2021-11-08 RX ORDER — AMOXICILLIN AND CLAVULANATE POTASSIUM 875; 125 MG/1; MG/1
1 TABLET, FILM COATED ORAL 2 TIMES DAILY
Qty: 20 TABLET | Refills: 0 | Status: SHIPPED | OUTPATIENT
Start: 2021-11-08 | End: 2021-11-18

## 2021-11-09 NOTE — PATIENT INSTRUCTIONS
Sinusitis (Antibiotic Treatment)    The sinuses are air-filled spaces within the bones of the face. They connect to the inside of the nose. Sinusitis is an inflammation of the tissue that lines the sinuses. Sinusitis can occur during a cold.  It can also pressure should not use decongestants. They can raise blood pressure.) Talk with your provider or pharmacist if you have any questions about the medicine. .  · OTC antihistamines may help if allergies contributed to your sinusitis.  Talk with your provider o information is not intended as a substitute for professional medical care. Always follow your healthcare professional's instructions.       Coronavirus Disease 2019 (COVID-19)     [facility] is committed to the safety and well-being of our patie will consider include stopping quarantine  • After 14 days from date of last exposure  • After 10 days without testing from date of last exposure  • After day 7 from date of last exposure with a negative test result (test must occur on day 5 or later)  Aft like counters, tabletops, and doorknobs. Use household cleaning sprays or wipes according to the label instructions.          Seek Further Care     If you are awaiting test results or are confirmed positive for COVID -19, and your symptoms worsen at home wi not received a call within 2 business days, please call your primary care provider or check Mipsohart for results. Post-Discharge Follow-up  If you are diagnosed with COVID, refrain from exercise until approved by your primary care provider.  Please call y coronavirus disease 2019, [place].[employer].pt. pdf  Centers for Disease Control & Prevention (CDC)  10 things you can do to manage your health at home, Diagnostic [employer].uk away from other people    References:  Long haulers: Why some people experience long-term coronavirus symptoms. (2021, February 08). Retrieved March 17, 2021, from [email or web address]-[email or web address]-19-long-reid. html  Long

## 2021-11-09 NOTE — PROGRESS NOTES
CHIEF COMPLAINT:     Patient presents with:  Sinus Problem      HPI:   Babak Torres is a 64year old female who presents with sinus pressure, pnd, sore throat, ears are plugged, Teeth hurt.   Some posterior headache and neck stiffness too Symptoms for a standard drinks      Comment: occasionally </=7    Drug use: No       Review of Systems:    Positive for stated complaint: sinus. Pertinent positives and negatives noted in the the HPI.     EXAM:   /70   Pulse 60   Temp 97 °F (36.1 °C) (Skin)   Resp at least 24 hours without fever reducers before leaving isolation. If COVID test is negative:  Discussed CDC guidelines regarding quarantining recommendations for vaccinated and unvaccinated persons.      Advise checking with local public health authori menthol rub at night may also help soothe symptoms.   · An expectorant with guaifenesin may help thin nasal mucus and help your sinuses drain fluids.  Talk with your provider or pharmacists before taking an over-the-counter (OTC) medicine if you have any qu worse  · Stiff neck  · Unusual drowsiness or confusion  · Swelling of your forehead or eyelids  · Symptoms don't go away in 10 days  · Vision problems, such as blurred or double vision  · Fever of 100.4ºF (38ºC) or higher, or as directed by your healthcare counts as close contact?     * You were within 6 feet of someone who has COVID-19 for at least 15 minutes  * You provided care at home to someone who is sick with COVID-19  * You had direct physical contact with the person (touched, hugged, or kissed them) immediately. 3. Get rest and stay hydrated.    4. If you have a medical appointment, call the healthcare provider ahead of time and tell them that you have or may have COVID-19.  5. For medical emergencies, call 911 and notify the dispatch personnel that y symptoms (e.g., cough, shortness of breath); and  · At least 10 days have passed since symptoms first appeared OR if asymptomatic patient or date of symptom onset is unclear then use 10 days post COVID test date.    · At least 20 days have passed for severe product suppliers) is coordinating plasma donations.     If you would be interested in donating your plasma to help treat others diagnosed with the virus, please contact Isaiah directly on their website: Paul pain  Palpitations Light headedness  Muscle Pain   Increased Heart Rate Tingling, numbness, or burning sensation   Shortness of breath Hair loss   GI disturbances  Fever  Swelling Joint Pain  Cough         Who is at risk for a Post-COVID condition?   It is

## 2022-03-09 ENCOUNTER — OFFICE VISIT (OUTPATIENT)
Dept: FAMILY MEDICINE CLINIC | Facility: CLINIC | Age: 57
End: 2022-03-09
Payer: COMMERCIAL

## 2022-03-09 VITALS
HEIGHT: 66 IN | SYSTOLIC BLOOD PRESSURE: 120 MMHG | BODY MASS INDEX: 31.34 KG/M2 | TEMPERATURE: 99 F | OXYGEN SATURATION: 97 % | HEART RATE: 56 BPM | DIASTOLIC BLOOD PRESSURE: 88 MMHG | WEIGHT: 195 LBS | RESPIRATION RATE: 16 BRPM

## 2022-03-09 DIAGNOSIS — J01.10 ACUTE NON-RECURRENT FRONTAL SINUSITIS: Primary | ICD-10-CM

## 2022-03-09 PROCEDURE — 3079F DIAST BP 80-89 MM HG: CPT | Performed by: PHYSICIAN ASSISTANT

## 2022-03-09 PROCEDURE — 99213 OFFICE O/P EST LOW 20 MIN: CPT | Performed by: PHYSICIAN ASSISTANT

## 2022-03-09 PROCEDURE — 3008F BODY MASS INDEX DOCD: CPT | Performed by: PHYSICIAN ASSISTANT

## 2022-03-09 PROCEDURE — 3074F SYST BP LT 130 MM HG: CPT | Performed by: PHYSICIAN ASSISTANT

## 2022-03-09 RX ORDER — AMOXICILLIN AND CLAVULANATE POTASSIUM 875; 125 MG/1; MG/1
1 TABLET, FILM COATED ORAL 2 TIMES DAILY
Qty: 14 TABLET | Refills: 0 | Status: SHIPPED | OUTPATIENT
Start: 2022-03-09 | End: 2022-03-16

## 2022-03-09 RX ORDER — FLUTICASONE PROPIONATE 50 MCG
2 SPRAY, SUSPENSION (ML) NASAL DAILY
Qty: 15.8 ML | Refills: 0 | Status: SHIPPED | OUTPATIENT
Start: 2022-03-09

## 2022-03-10 ENCOUNTER — TELEPHONE (OUTPATIENT)
Dept: FAMILY MEDICINE CLINIC | Facility: CLINIC | Age: 57
End: 2022-03-10

## 2022-03-10 LAB — SARS-COV-2 RNA RESP QL NAA+PROBE: NOT DETECTED

## 2022-03-10 RX ORDER — BENZONATATE 200 MG/1
200 CAPSULE ORAL 3 TIMES DAILY PRN
Qty: 30 CAPSULE | Refills: 0 | Status: SHIPPED | OUTPATIENT
Start: 2022-03-10

## 2022-03-10 NOTE — TELEPHONE ENCOUNTER
Virtual Telephone Check-In    Butcher called clinic to state that she seemed to have worsening cough today and developed fever to 102. Reports has taken 2 doses of abx, feels achy today as well. Report had coughing attack at school. She would like to know what to take for fever/cough. Patient denies any difficulty breathing, wheezing/sob. Reports feels \"achy\" all over but no specific abdominal or back pain. Covid test came back negative today. I discussed with patient she can take ibuprofen for fever, tessalon for cough was sent. Continue abx, push fluids. Advised if fever persists tomorrow, should be revaluated. If any worsening symptoms such as breathing problems, sob, severe fatigue/weakness, abd pain should be evaluated immediately in ED.        DELIA Woodall

## 2022-05-16 ENCOUNTER — TELEPHONE (OUTPATIENT)
Dept: FAMILY MEDICINE CLINIC | Facility: CLINIC | Age: 57
End: 2022-05-16

## 2022-05-16 NOTE — TELEPHONE ENCOUNTER
LOV 09/15/2020 - with Dr. Saroj Wong   Date Time Provider Hussain Antonina   6/2/2022  3:20 PM Timmy Ramires MD [facility] YUN Frazier       Pt looking for ENT referral - allergies and sinus issues  Pt willing to schedule video visit sooner if needed    Please advise, thank you

## 2022-05-16 NOTE — TELEPHONE ENCOUNTER
Patient has an appt for 6/2 to discuss some allergy and sinus issues that have been ongoing for a long time    She would like to see an ENT    She would rather not just pick an ENT so she's looking for a referral. She is willing to do a visit or video visit sooner but she is a     Please adv    Thank you

## 2022-05-18 NOTE — TELEPHONE ENCOUNTER
Advised patient of Doctor's note below. Patient verbalized understanding.      Pt requesting recommendations for ENT, does not want to wait until appt - advised pt we refer to [facility] providers and she may start there - she v/u    Pt reports future appt as physical  No further questions at this time    Future Appointments   Date Time Provider Hussain Sarkar   6/2/2022  3:20 PM Timmy Hendricks [place] [facility]

## 2022-05-18 NOTE — TELEPHONE ENCOUNTER
Will discuss further at her upcoming appt on 6/2. If any worsening - fever, chills or worsening respiratory symptoms go to the IC.

## 2022-06-02 ENCOUNTER — OFFICE VISIT (OUTPATIENT)
Dept: FAMILY MEDICINE CLINIC | Facility: CLINIC | Age: 57
End: 2022-06-02
Payer: COMMERCIAL

## 2022-06-02 VITALS
BODY MASS INDEX: 33.67 KG/M2 | OXYGEN SATURATION: 98 % | HEIGHT: 65 IN | SYSTOLIC BLOOD PRESSURE: 118 MMHG | WEIGHT: 202.13 LBS | TEMPERATURE: 98 F | RESPIRATION RATE: 16 BRPM | DIASTOLIC BLOOD PRESSURE: 80 MMHG | HEART RATE: 83 BPM

## 2022-06-02 DIAGNOSIS — Z71.85 VACCINE COUNSELING: ICD-10-CM

## 2022-06-02 DIAGNOSIS — Z12.31 SCREENING MAMMOGRAM FOR BREAST CANCER: ICD-10-CM

## 2022-06-02 DIAGNOSIS — Z00.00 ANNUAL PHYSICAL EXAM: Primary | ICD-10-CM

## 2022-06-02 DIAGNOSIS — Z78.9 TAKES DIETARY SUPPLEMENTS: ICD-10-CM

## 2022-06-02 DIAGNOSIS — Z13.29 SCREENING FOR THYROID DISORDER: ICD-10-CM

## 2022-06-02 DIAGNOSIS — E55.9 VITAMIN D DEFICIENCY: ICD-10-CM

## 2022-06-02 DIAGNOSIS — R73.03 PREDIABETES: ICD-10-CM

## 2022-06-02 DIAGNOSIS — Z86.010 HISTORY OF COLON POLYPS: ICD-10-CM

## 2022-06-02 DIAGNOSIS — M54.2 TENDERNESS OF NECK: ICD-10-CM

## 2022-06-02 DIAGNOSIS — K59.09 OTHER CONSTIPATION: ICD-10-CM

## 2022-06-02 DIAGNOSIS — E78.00 ELEVATED LDL CHOLESTEROL LEVEL: ICD-10-CM

## 2022-06-02 DIAGNOSIS — R76.8 THYROID ANTIBODY POSITIVE: ICD-10-CM

## 2022-06-02 DIAGNOSIS — Z90.49 S/P COLON RESECTION: ICD-10-CM

## 2022-06-02 DIAGNOSIS — Z13.1 SCREENING FOR DIABETES MELLITUS: ICD-10-CM

## 2022-06-02 DIAGNOSIS — Z13.0 SCREENING, ANEMIA, DEFICIENCY, IRON: ICD-10-CM

## 2022-06-02 PROCEDURE — 3079F DIAST BP 80-89 MM HG: CPT | Performed by: FAMILY MEDICINE

## 2022-06-02 PROCEDURE — 99213 OFFICE O/P EST LOW 20 MIN: CPT | Performed by: FAMILY MEDICINE

## 2022-06-02 PROCEDURE — 3008F BODY MASS INDEX DOCD: CPT | Performed by: FAMILY MEDICINE

## 2022-06-02 PROCEDURE — 3074F SYST BP LT 130 MM HG: CPT | Performed by: FAMILY MEDICINE

## 2022-06-02 PROCEDURE — 99396 PREV VISIT EST AGE 40-64: CPT | Performed by: FAMILY MEDICINE

## 2022-06-03 PROBLEM — M35.00 PRIMARY SJOGREN'S SYNDROME (HCC): Status: ACTIVE | Noted: 2017-09-08

## 2022-06-14 ENCOUNTER — TELEPHONE (OUTPATIENT)
Dept: FAMILY MEDICINE CLINIC | Facility: CLINIC | Age: 57
End: 2022-06-14

## 2022-06-14 NOTE — TELEPHONE ENCOUNTER
Recommendations: OTC Miralax and Glycerin suppositories (also OTC), and the use of Colace 100 mg twice daily (this is also OTC) .

## 2022-06-14 NOTE — TELEPHONE ENCOUNTER
Patient calling stating [facility] never received new prescription. Patient states this medication has Miralax as the main ingredient. Patient not sure of the name of medication.     [facility] [address] [place] [address] [address] AT [address] [facility] [address] [phone], [phone] [address] [facility]   Phone: [phone] Fax: [phone]

## 2022-06-16 ENCOUNTER — NURSE ONLY (OUTPATIENT)
Dept: FAMILY MEDICINE CLINIC | Facility: CLINIC | Age: 57
End: 2022-06-16
Payer: COMMERCIAL

## 2022-06-16 DIAGNOSIS — K59.09 CHRONIC CONSTIPATION: ICD-10-CM

## 2022-06-16 DIAGNOSIS — Z00.00 ANNUAL PHYSICAL EXAM: ICD-10-CM

## 2022-06-16 DIAGNOSIS — Z13.29 SCREENING FOR THYROID DISORDER: ICD-10-CM

## 2022-06-16 DIAGNOSIS — Z13.1 SCREENING FOR DIABETES MELLITUS: ICD-10-CM

## 2022-06-16 DIAGNOSIS — E78.00 ELEVATED LDL CHOLESTEROL LEVEL: ICD-10-CM

## 2022-06-16 DIAGNOSIS — E55.9 VITAMIN D DEFICIENCY: ICD-10-CM

## 2022-06-16 DIAGNOSIS — R73.03 PREDIABETES: ICD-10-CM

## 2022-06-16 DIAGNOSIS — K21.9 GERD WITHOUT ESOPHAGITIS: ICD-10-CM

## 2022-06-16 DIAGNOSIS — Z13.0 SCREENING, ANEMIA, DEFICIENCY, IRON: ICD-10-CM

## 2022-06-16 DIAGNOSIS — R76.8 THYROID ANTIBODY POSITIVE: ICD-10-CM

## 2022-06-16 LAB
ALBUMIN SERPL-MCNC: 3.9 G/DL (ref 3.4–5)
ALBUMIN/GLOB SERPL: 1.1 {RATIO} (ref 1–2)
ALP LIVER SERPL-CCNC: 77 U/L
ALT SERPL-CCNC: 21 U/L
ANION GAP SERPL CALC-SCNC: 2 MMOL/L (ref 0–18)
AST SERPL-CCNC: 19 U/L (ref 15–37)
BASOPHILS # BLD AUTO: 0.04 X10(3) UL (ref 0–0.2)
BASOPHILS NFR BLD AUTO: 0.7 %
BILIRUB SERPL-MCNC: 0.4 MG/DL (ref 0.1–2)
BUN BLD-MCNC: 12 MG/DL (ref 7–18)
CALCIUM BLD-MCNC: 9.2 MG/DL (ref 8.5–10.1)
CHLORIDE SERPL-SCNC: 109 MMOL/L (ref 98–112)
CHOLEST SERPL-MCNC: 190 MG/DL (ref ?–200)
CO2 SERPL-SCNC: 28 MMOL/L (ref 21–32)
CREAT BLD-MCNC: 0.89 MG/DL
EOSINOPHIL # BLD AUTO: 0.18 X10(3) UL (ref 0–0.7)
EOSINOPHIL NFR BLD AUTO: 3.3 %
ERYTHROCYTE [DISTWIDTH] IN BLOOD BY AUTOMATED COUNT: 12.6 %
EST. AVERAGE GLUCOSE BLD GHB EST-MCNC: 111 MG/DL (ref 68–126)
FASTING PATIENT LIPID ANSWER: YES
FASTING STATUS PATIENT QL REPORTED: YES
GLOBULIN PLAS-MCNC: 3.5 G/DL (ref 2.8–4.4)
GLUCOSE BLD-MCNC: 88 MG/DL (ref 70–99)
HBA1C MFR BLD: 5.5 % (ref ?–5.7)
HCT VFR BLD AUTO: 45.7 %
HDLC SERPL-MCNC: 42 MG/DL (ref 40–59)
HGB BLD-MCNC: 14.7 G/DL
IMM GRANULOCYTES # BLD AUTO: 0.01 X10(3) UL (ref 0–1)
IMM GRANULOCYTES NFR BLD: 0.2 %
LDLC SERPL CALC-MCNC: 130 MG/DL (ref ?–100)
LYMPHOCYTES # BLD AUTO: 1.76 X10(3) UL (ref 1–4)
LYMPHOCYTES NFR BLD AUTO: 32.2 %
MCH RBC QN AUTO: 29.9 PG (ref 26–34)
MCHC RBC AUTO-ENTMCNC: 32.2 G/DL (ref 31–37)
MCV RBC AUTO: 93.1 FL
MONOCYTES # BLD AUTO: 0.47 X10(3) UL (ref 0.1–1)
MONOCYTES NFR BLD AUTO: 8.6 %
NEUTROPHILS # BLD AUTO: 3.01 X10 (3) UL (ref 1.5–7.7)
NEUTROPHILS # BLD AUTO: 3.01 X10(3) UL (ref 1.5–7.7)
NEUTROPHILS NFR BLD AUTO: 55 %
NONHDLC SERPL-MCNC: 148 MG/DL (ref ?–130)
OSMOLALITY SERPL CALC.SUM OF ELEC: 287 MOSM/KG (ref 275–295)
PLATELET # BLD AUTO: 295 10(3)UL (ref 150–450)
POTASSIUM SERPL-SCNC: 4.3 MMOL/L (ref 3.5–5.1)
PROT SERPL-MCNC: 7.4 G/DL (ref 6.4–8.2)
RBC # BLD AUTO: 4.91 X10(6)UL
SODIUM SERPL-SCNC: 139 MMOL/L (ref 136–145)
TRIGL SERPL-MCNC: 100 MG/DL (ref 30–149)
TSI SER-ACNC: 2.44 MIU/ML (ref 0.36–3.74)
VIT D+METAB SERPL-MCNC: 42 NG/ML (ref 30–100)
VLDLC SERPL CALC-MCNC: 18 MG/DL (ref 0–30)
WBC # BLD AUTO: 5.5 X10(3) UL (ref 4–11)

## 2022-06-16 PROCEDURE — 83036 HEMOGLOBIN GLYCOSYLATED A1C: CPT | Performed by: FAMILY MEDICINE

## 2022-06-16 PROCEDURE — 80050 GENERAL HEALTH PANEL: CPT | Performed by: FAMILY MEDICINE

## 2022-06-16 PROCEDURE — 80061 LIPID PANEL: CPT | Performed by: FAMILY MEDICINE

## 2022-06-16 PROCEDURE — 82306 VITAMIN D 25 HYDROXY: CPT | Performed by: FAMILY MEDICINE

## 2022-06-16 RX ORDER — POLYETHYLENE GLYCOL 3350 17 G/17G
POWDER, FOR SOLUTION ORAL
Qty: 507 G | Refills: 0 | Status: SHIPPED | OUTPATIENT
Start: 2022-06-16

## 2022-06-16 NOTE — TELEPHONE ENCOUNTER
LOV 06/02/2022    Last refill on 03/22/2018, for #527 g, with 0 refills  POLYETHYLENE GLYCOL 3350 Oral Powder     Future Appointments   Date Time Provider Hussain Sarkar   6/23/2022  2:30 PM [address] [place] [address]   7/28/2022  8:20 AM Nick Coy [facility] Ana M Arreguin) pending, please review. Thank you.

## 2022-06-16 NOTE — PROGRESS NOTES
Kennedy Beasley present in office for nurse visit. Labs as ordered by Dr. Angelina Garcia; 24 g, Right hand, lavender tube, green tube and gold tube     All questions/concerns addressed. Patient left in stable condition.

## 2022-06-23 ENCOUNTER — HOSPITAL ENCOUNTER (OUTPATIENT)
Dept: ULTRASOUND IMAGING | Age: 57
Discharge: HOME | End: 2022-06-23
Attending: FAMILY MEDICINE
Payer: COMMERCIAL

## 2022-06-23 ENCOUNTER — PATIENT MESSAGE (OUTPATIENT)
Dept: FAMILY MEDICINE CLINIC | Facility: CLINIC | Age: 57
End: 2022-06-23

## 2022-06-23 DIAGNOSIS — Z13.29 SCREENING FOR THYROID DISORDER: ICD-10-CM

## 2022-06-23 DIAGNOSIS — M54.2 TENDERNESS OF NECK: ICD-10-CM

## 2022-06-23 DIAGNOSIS — R76.8 THYROID ANTIBODY POSITIVE: ICD-10-CM

## 2022-06-23 DIAGNOSIS — E06.9 THYROIDITIS: Primary | ICD-10-CM

## 2022-06-23 PROCEDURE — 76536 US EXAM OF HEAD AND NECK: CPT | Performed by: FAMILY MEDICINE

## 2022-06-24 NOTE — TELEPHONE ENCOUNTER
From: Farrah Beasley  To: Timmy Amaya MD  Sent: 6/23/2022 7:51 PM CDT  Subject: Ultrasound    I do not understand exactly what this means and if I need to follow up with anyone re ultrasound results. Thank you in advance for you help.

## 2022-06-27 NOTE — TELEPHONE ENCOUNTER
Referral order placed  [facility] sent to pt regarding doctor's note below  Notify me if not read by 07/04/22 no

## 2022-06-27 NOTE — TELEPHONE ENCOUNTER
Her US shows signs of thyroiditis (we are well aware that she has a diagnosis of this), and one possible calcification in the L lobe. However during my exam we discussed the anterior neck fullness and she brought up that she was tendern over the R side. She will benefit from an Endo follow up / will need establish care with one. No immediate concerns however will benefit from this consult. Please place referral for her to see Dr Alireza oGmes / Dr Katelyn Romano / benjamin PA. Thank you.

## 2022-06-29 ENCOUNTER — MED REC SCAN ONLY (OUTPATIENT)
Dept: FAMILY MEDICINE CLINIC | Facility: CLINIC | Age: 57
End: 2022-06-29

## 2022-07-28 ENCOUNTER — MED REC SCAN ONLY (OUTPATIENT)
Dept: FAMILY MEDICINE CLINIC | Facility: CLINIC | Age: 57
End: 2022-07-28

## 2022-08-26 ENCOUNTER — TELEPHONE (OUTPATIENT)
Dept: FAMILY MEDICINE CLINIC | Facility: CLINIC | Age: 57
End: 2022-08-26

## 2022-08-26 NOTE — TELEPHONE ENCOUNTER
Letter mailed to patient reminding her she has overdue orders.     Lab Frequency Next Occurrence   FLORES SCREENING BILAT (CPT=77067) Once 06/02/2022

## 2022-08-31 ENCOUNTER — TELEPHONE (OUTPATIENT)
Dept: FAMILY MEDICINE CLINIC | Facility: CLINIC | Age: 57
End: 2022-08-31

## 2022-08-31 NOTE — TELEPHONE ENCOUNTER
Per CDC:  You should get a COVID-19 vaccine even if you already had COVID-19. Getting a COVID-19 vaccine after you recover from COVID-19 infection provides added protection against COVID-19. You may consider delaying your vaccine by 3 months from when your symptoms started or, if you had no symptoms, when you received a positive test.    [facility] sent to pt of note above and of covid home supportive care recommendations  Notify me if not read by 09/07/22    Routing to PCP as FYI   Please advise if further recommendations.  Thank you

## 2022-08-31 NOTE — TELEPHONE ENCOUNTER
Patient tested positive for Covid yesterday    She is experiencing cough, fever, runny nose    She is treating with otc meds and pushing fluids and rest    She wanted it noted in chart    She also wanted to check to see if booster is still recommended now that she has had the virus and when she should get it if so    Please adv    Thank you

## 2022-11-11 NOTE — TELEPHONE ENCOUNTER
PT CALLED TODAY AND ADV THAT SHE NEEDS PRE-OP TODAY. PT HAVING L FOOT BUNION SURGERY NEXT WEEK. WILL BE GOING OUT OF TOWN UP UNTIL SURGERY DAY.    WONDERING IF WE CAN USE SICK APPT SLOTS?
Spoke with the pt and offered her an appt at 330 today  And she is agreeable  Future Appointments  Date Time Provider Hussain Sarkar   5/30/2018 3:30 PM Jerica Champion MD [facility] Missy Hayes     She went to [employer] day to have the pre op testi
surgical precautions

## 2022-11-30 ENCOUNTER — OFFICE VISIT (OUTPATIENT)
Dept: FAMILY MEDICINE CLINIC | Facility: CLINIC | Age: 57
End: 2022-11-30
Payer: COMMERCIAL

## 2022-11-30 VITALS
HEART RATE: 67 BPM | OXYGEN SATURATION: 99 % | WEIGHT: 190 LBS | SYSTOLIC BLOOD PRESSURE: 137 MMHG | RESPIRATION RATE: 18 BRPM | BODY MASS INDEX: 30.53 KG/M2 | HEIGHT: 66 IN | DIASTOLIC BLOOD PRESSURE: 84 MMHG | TEMPERATURE: 97 F

## 2022-11-30 DIAGNOSIS — J01.00 ACUTE NON-RECURRENT MAXILLARY SINUSITIS: Primary | ICD-10-CM

## 2022-11-30 PROCEDURE — 3075F SYST BP GE 130 - 139MM HG: CPT | Performed by: NURSE PRACTITIONER

## 2022-11-30 PROCEDURE — 3079F DIAST BP 80-89 MM HG: CPT | Performed by: NURSE PRACTITIONER

## 2022-11-30 PROCEDURE — 3008F BODY MASS INDEX DOCD: CPT | Performed by: NURSE PRACTITIONER

## 2022-11-30 PROCEDURE — 99213 OFFICE O/P EST LOW 20 MIN: CPT | Performed by: NURSE PRACTITIONER

## 2022-11-30 RX ORDER — AMOXICILLIN AND CLAVULANATE POTASSIUM 875; 125 MG/1; MG/1
1 TABLET, FILM COATED ORAL 2 TIMES DAILY
Qty: 20 TABLET | Refills: 0 | Status: SHIPPED | OUTPATIENT
Start: 2022-11-30 | End: 2022-12-10

## 2022-11-30 RX ORDER — FLUTICASONE PROPIONATE 50 MCG
1 SPRAY, SUSPENSION (ML) NASAL DAILY
Qty: 1 EACH | Refills: 0 | Status: SHIPPED | OUTPATIENT
Start: 2022-11-30 | End: 2022-12-10

## 2022-11-30 NOTE — PATIENT INSTRUCTIONS
1. Rest. Drink plenty of fluids. 2. Augmentin as prescribed. Flonase as prescribed. 3. Supportive care as discussed. 4. Nasal rinses as directed. Use humidifier at home when possible. 5. Follow up with PMD in 3-4 days for reeval. Follow up sooner or go to the emergency department immediately if symptoms worsen, change, or if you have any concerns.

## 2023-02-27 ENCOUNTER — OFFICE VISIT (OUTPATIENT)
Dept: FAMILY MEDICINE CLINIC | Facility: CLINIC | Age: 58
End: 2023-02-27
Payer: COMMERCIAL

## 2023-02-27 VITALS
HEIGHT: 66 IN | BODY MASS INDEX: 30.53 KG/M2 | DIASTOLIC BLOOD PRESSURE: 78 MMHG | HEART RATE: 70 BPM | SYSTOLIC BLOOD PRESSURE: 136 MMHG | WEIGHT: 190 LBS | RESPIRATION RATE: 18 BRPM | OXYGEN SATURATION: 98 % | TEMPERATURE: 97 F

## 2023-02-27 DIAGNOSIS — J01.00 ACUTE NON-RECURRENT MAXILLARY SINUSITIS: Primary | ICD-10-CM

## 2023-02-27 PROCEDURE — 99213 OFFICE O/P EST LOW 20 MIN: CPT | Performed by: PHYSICIAN ASSISTANT

## 2023-02-27 PROCEDURE — 3008F BODY MASS INDEX DOCD: CPT | Performed by: PHYSICIAN ASSISTANT

## 2023-02-27 PROCEDURE — 3078F DIAST BP <80 MM HG: CPT | Performed by: PHYSICIAN ASSISTANT

## 2023-02-27 PROCEDURE — 3075F SYST BP GE 130 - 139MM HG: CPT | Performed by: PHYSICIAN ASSISTANT

## 2023-02-27 RX ORDER — FLUTICASONE PROPIONATE 50 MCG
2 SPRAY, SUSPENSION (ML) NASAL DAILY
Qty: 1 EACH | Refills: 0 | Status: SHIPPED | OUTPATIENT
Start: 2023-02-27

## 2023-02-27 RX ORDER — AMOXICILLIN AND CLAVULANATE POTASSIUM 875; 125 MG/1; MG/1
1 TABLET, FILM COATED ORAL 2 TIMES DAILY
Qty: 20 TABLET | Refills: 0 | Status: SHIPPED | OUTPATIENT
Start: 2023-02-27 | End: 2023-03-09

## 2023-02-27 RX ORDER — BENZONATATE 200 MG/1
200 CAPSULE ORAL 3 TIMES DAILY PRN
Qty: 30 CAPSULE | Refills: 0 | Status: SHIPPED | OUTPATIENT
Start: 2023-02-27

## 2023-05-31 ENCOUNTER — HOSPITAL ENCOUNTER (OUTPATIENT)
Dept: MAMMOGRAPHY | Age: 58
Discharge: HOME | End: 2023-05-31
Attending: FAMILY MEDICINE
Payer: COMMERCIAL

## 2023-05-31 DIAGNOSIS — Z12.31 SCREENING MAMMOGRAM FOR BREAST CANCER: ICD-10-CM

## 2023-05-31 PROCEDURE — 77067 SCR MAMMO BI INCL CAD: CPT | Performed by: FAMILY MEDICINE

## 2023-05-31 PROCEDURE — 77063 BREAST TOMOSYNTHESIS BI: CPT | Performed by: FAMILY MEDICINE

## 2023-06-09 ENCOUNTER — HOSPITAL ENCOUNTER (OUTPATIENT)
Dept: MAMMOGRAPHY | Facility: HOSPITAL | Age: 58
Discharge: HOME | End: 2023-06-09
Attending: FAMILY MEDICINE
Payer: COMMERCIAL

## 2023-06-09 DIAGNOSIS — R92.2 INCONCLUSIVE MAMMOGRAM: ICD-10-CM

## 2023-06-09 PROCEDURE — 77061 BREAST TOMOSYNTHESIS UNI: CPT | Performed by: FAMILY MEDICINE

## 2023-06-09 PROCEDURE — 77065 DX MAMMO INCL CAD UNI: CPT | Performed by: FAMILY MEDICINE

## 2023-06-09 PROCEDURE — 76642 ULTRASOUND BREAST LIMITED: CPT | Performed by: FAMILY MEDICINE

## 2023-08-31 ENCOUNTER — TELEPHONE (OUTPATIENT)
Dept: FAMILY MEDICINE CLINIC | Facility: CLINIC | Age: 58
End: 2023-08-31

## 2023-09-19 ENCOUNTER — OFFICE VISIT (OUTPATIENT)
Dept: FAMILY MEDICINE CLINIC | Facility: CLINIC | Age: 58
End: 2023-09-19
Payer: COMMERCIAL

## 2023-09-19 VITALS
WEIGHT: 194 LBS | RESPIRATION RATE: 16 BRPM | HEART RATE: 68 BPM | HEIGHT: 66 IN | DIASTOLIC BLOOD PRESSURE: 84 MMHG | SYSTOLIC BLOOD PRESSURE: 126 MMHG | TEMPERATURE: 98 F | OXYGEN SATURATION: 99 % | BODY MASS INDEX: 31.18 KG/M2

## 2023-09-19 DIAGNOSIS — Z00.00 ANNUAL PHYSICAL EXAM: Primary | ICD-10-CM

## 2023-09-19 DIAGNOSIS — Z23 ENCOUNTER FOR IMMUNIZATION: ICD-10-CM

## 2023-09-19 DIAGNOSIS — Z13.6 SCREENING FOR CARDIOVASCULAR CONDITION: ICD-10-CM

## 2023-09-19 DIAGNOSIS — R53.83 OTHER FATIGUE: ICD-10-CM

## 2023-09-19 DIAGNOSIS — R06.83 SNORING: ICD-10-CM

## 2023-09-19 LAB
ALBUMIN SERPL-MCNC: 4.1 G/DL (ref 3.4–5)
ALBUMIN/GLOB SERPL: 1.3 {RATIO} (ref 1–2)
ALP LIVER SERPL-CCNC: 74 U/L
ALT SERPL-CCNC: 27 U/L
ANION GAP SERPL CALC-SCNC: 1 MMOL/L (ref 0–18)
AST SERPL-CCNC: 22 U/L (ref 15–37)
BASOPHILS # BLD AUTO: 0.05 X10(3) UL (ref 0–0.2)
BASOPHILS NFR BLD AUTO: 0.7 %
BILIRUB SERPL-MCNC: 0.6 MG/DL (ref 0.1–2)
BUN BLD-MCNC: 10 MG/DL (ref 7–18)
CALCIUM BLD-MCNC: 9.1 MG/DL (ref 8.5–10.1)
CHLORIDE SERPL-SCNC: 109 MMOL/L (ref 98–112)
CHOLEST SERPL-MCNC: 223 MG/DL (ref ?–200)
CO2 SERPL-SCNC: 30 MMOL/L (ref 21–32)
CREAT BLD-MCNC: 0.89 MG/DL
EGFRCR SERPLBLD CKD-EPI 2021: 76 ML/MIN/1.73M2 (ref 60–?)
EOSINOPHIL # BLD AUTO: 0.18 X10(3) UL (ref 0–0.7)
EOSINOPHIL NFR BLD AUTO: 2.5 %
ERYTHROCYTE [DISTWIDTH] IN BLOOD BY AUTOMATED COUNT: 12.8 %
FASTING PATIENT LIPID ANSWER: NO
FASTING STATUS PATIENT QL REPORTED: NO
GLOBULIN PLAS-MCNC: 3.2 G/DL (ref 2.8–4.4)
GLUCOSE BLD-MCNC: 100 MG/DL (ref 70–99)
HCT VFR BLD AUTO: 42.7 %
HDLC SERPL-MCNC: 44 MG/DL (ref 40–59)
HGB BLD-MCNC: 13.7 G/DL
IMM GRANULOCYTES # BLD AUTO: 0.01 X10(3) UL (ref 0–1)
IMM GRANULOCYTES NFR BLD: 0.1 %
LDLC SERPL CALC-MCNC: 150 MG/DL (ref ?–100)
LYMPHOCYTES # BLD AUTO: 2.48 X10(3) UL (ref 1–4)
LYMPHOCYTES NFR BLD AUTO: 34.9 %
MCH RBC QN AUTO: 30 PG (ref 26–34)
MCHC RBC AUTO-ENTMCNC: 32.1 G/DL (ref 31–37)
MCV RBC AUTO: 93.6 FL
MONOCYTES # BLD AUTO: 0.54 X10(3) UL (ref 0.1–1)
MONOCYTES NFR BLD AUTO: 7.6 %
NEUTROPHILS # BLD AUTO: 3.85 X10 (3) UL (ref 1.5–7.7)
NEUTROPHILS # BLD AUTO: 3.85 X10(3) UL (ref 1.5–7.7)
NEUTROPHILS NFR BLD AUTO: 54.2 %
NONHDLC SERPL-MCNC: 179 MG/DL (ref ?–130)
OSMOLALITY SERPL CALC.SUM OF ELEC: 289 MOSM/KG (ref 275–295)
PLATELET # BLD AUTO: 316 10(3)UL (ref 150–450)
POTASSIUM SERPL-SCNC: 4.5 MMOL/L (ref 3.5–5.1)
PROT SERPL-MCNC: 7.3 G/DL (ref 6.4–8.2)
RBC # BLD AUTO: 4.56 X10(6)UL
SODIUM SERPL-SCNC: 140 MMOL/L (ref 136–145)
TRIGL SERPL-MCNC: 160 MG/DL (ref 30–149)
TSI SER-ACNC: 2.82 MIU/ML (ref 0.36–3.74)
VLDLC SERPL CALC-MCNC: 30 MG/DL (ref 0–30)
WBC # BLD AUTO: 7.1 X10(3) UL (ref 4–11)

## 2023-09-19 PROCEDURE — 80061 LIPID PANEL: CPT | Performed by: NURSE PRACTITIONER

## 2023-09-19 PROCEDURE — 3008F BODY MASS INDEX DOCD: CPT | Performed by: NURSE PRACTITIONER

## 2023-09-19 PROCEDURE — 99396 PREV VISIT EST AGE 40-64: CPT | Performed by: NURSE PRACTITIONER

## 2023-09-19 PROCEDURE — 90750 HZV VACC RECOMBINANT IM: CPT | Performed by: NURSE PRACTITIONER

## 2023-09-19 PROCEDURE — 3079F DIAST BP 80-89 MM HG: CPT | Performed by: NURSE PRACTITIONER

## 2023-09-19 PROCEDURE — 90471 IMMUNIZATION ADMIN: CPT | Performed by: NURSE PRACTITIONER

## 2023-09-19 PROCEDURE — 80050 GENERAL HEALTH PANEL: CPT | Performed by: NURSE PRACTITIONER

## 2023-09-19 PROCEDURE — 3074F SYST BP LT 130 MM HG: CPT | Performed by: NURSE PRACTITIONER

## 2023-09-19 RX ORDER — IBUPROFEN 200 MG
200 TABLET ORAL 2 TIMES DAILY
COMMUNITY

## 2023-09-19 NOTE — PATIENT INSTRUCTIONS
Complete blood work today     We will call with results     If labs are normal and sleep study is normal, consider following-up with [facility], neuro-ophthalmologist    Work towards completing sleep study to reassess for sleep apnea, sleep apnea can contribute to ocular migraines and headaches

## 2023-09-21 ENCOUNTER — TELEPHONE (OUTPATIENT)
Dept: FAMILY MEDICINE CLINIC | Facility: CLINIC | Age: 58
End: 2023-09-21

## 2023-09-21 NOTE — TELEPHONE ENCOUNTER
----- Message from [employer] APRJENNIFER sent at 9/21/2023 10:18 AM CDT -----  CMP - blood sugar, electrolytes, kidney function, liver enzymes and protein levels are normal    LIPID PANEL - Triglycerides are slightly elevated. LDL is elevated, this is considered \"bad\" cholesterol. Improve diet by limiting fried foods, red meat, deli meat, pastries, chips, butter, and ice cream.  Increase aerobic exercise, the goal is 10,000 steps a day or 2.5hours a week. To follow-up on question regarding omega 3 fatty acid, it would help your triglyceride level val it is thought that omega-3 oils help with constipation by lubricating the intestinal walls. According to the national instutitue of health, omega fatty acids also reduce intesntinal inflammation. TSH - thyroid is functioning normally    CBC - blood counts are all stable    Lets get the heart scan scheduled. This will help us determine if we have some time to focus on lifestyle changes or if we need to start medication for cholesterol. At a minimum, we will consider rechecking lipid panel before the end of the year.

## 2023-10-02 ENCOUNTER — HOSPITAL ENCOUNTER (OUTPATIENT)
Dept: CT IMAGING | Age: 58
Discharge: HOME | End: 2023-10-02
Attending: NURSE PRACTITIONER

## 2023-10-02 ENCOUNTER — TELEPHONE (OUTPATIENT)
Dept: FAMILY MEDICINE CLINIC | Facility: CLINIC | Age: 58
End: 2023-10-02

## 2023-10-02 DIAGNOSIS — R53.82 CHRONIC FATIGUE: ICD-10-CM

## 2023-10-02 DIAGNOSIS — Z13.6 SCREENING FOR CARDIOVASCULAR CONDITION: ICD-10-CM

## 2023-10-02 DIAGNOSIS — Z00.00 ANNUAL PHYSICAL EXAM: ICD-10-CM

## 2023-10-02 DIAGNOSIS — G25.81 RLS (RESTLESS LEGS SYNDROME): Primary | ICD-10-CM

## 2023-10-02 NOTE — TELEPHONE ENCOUNTER
[address] Patient - Pt states that sleep study referral is for Dr. Nat Viera and he is only located in [place] [address].  Pt would like a doctor/facility closer to [place]. Sleep study location in [place]? Please advise. Thank you! *Doesn't mind [place] or [place] location if she has to.

## 2023-10-03 NOTE — TELEPHONE ENCOUNTER
New referral entered in for [place] location? Otherwise, please advise patient to reach out to insurance for providers within network.

## 2023-10-03 NOTE — TELEPHONE ENCOUNTER
Patient notified and verbalized understanding.    Number to schedule with Dr Linda Cantrell provided

## 2023-10-09 ENCOUNTER — OFFICE VISIT (OUTPATIENT)
Facility: CLINIC | Age: 58
End: 2023-10-09
Payer: COMMERCIAL

## 2023-10-09 VITALS
OXYGEN SATURATION: 98 % | HEART RATE: 67 BPM | TEMPERATURE: 98 F | SYSTOLIC BLOOD PRESSURE: 118 MMHG | WEIGHT: 197 LBS | DIASTOLIC BLOOD PRESSURE: 70 MMHG | BODY MASS INDEX: 31.66 KG/M2 | RESPIRATION RATE: 18 BRPM | HEIGHT: 66 IN

## 2023-10-09 DIAGNOSIS — G47.33 OSA (OBSTRUCTIVE SLEEP APNEA): ICD-10-CM

## 2023-10-09 DIAGNOSIS — R53.82 CHRONIC FATIGUE: ICD-10-CM

## 2023-10-09 DIAGNOSIS — R91.8 PULMONARY NODULES: ICD-10-CM

## 2023-10-09 DIAGNOSIS — G47.30 SLEEP APNEA, UNSPECIFIED TYPE: ICD-10-CM

## 2023-10-09 DIAGNOSIS — K21.9 GASTROESOPHAGEAL REFLUX DISEASE WITHOUT ESOPHAGITIS: Primary | ICD-10-CM

## 2023-10-09 NOTE — PROGRESS NOTES
This is a 62year old female who presents with the following symptoms, risk factors, behaviors or other items associated with sleep problems. Sleep Apnea:   snoring; stops breathing; wakes with headache; wakes with dry mouth; nasal congestion  Insomnia:  difficulty falling asleep; difficulty staying asleep; waking too early; non-refreshing sleep; restless sleep; mind racing; pain or discomfort; job stress  Restless Leg:  tingling or crawly feeling in the legs; moving leges helps relieve discomfort  Parasomnias:   sleep walking; very restless sleep; teeth grinding  Daytime Problems:  sleepiness; fatigue; irritable/stearns; dificulty concentrating; inattentiveness; memory problems; previous sleep study    The patient's [place] Sleepiness score is 6/24.

## 2023-10-13 ENCOUNTER — TELEPHONE (OUTPATIENT)
Dept: FAMILY MEDICINE CLINIC | Facility: CLINIC | Age: 58
End: 2023-10-13

## 2023-10-13 NOTE — TELEPHONE ENCOUNTER
----- Message from DELIA Segura sent at 10/13/2023  8:11 AM CDT -----  Calcium Score: 66.44 indicating mild risk, would benefit from cholesterol medication, recommending atorvastatin 10mg daily and follow-up in 3 months n/a

## 2023-10-13 NOTE — TELEPHONE ENCOUNTER
Left detailed message to voicemail (per verbal release form consent with confirmed identifying message) of APRN's note below.  Patient was advised to call office back with preferred pharmacy - will need to send Rx

## 2023-10-16 ENCOUNTER — OFFICE VISIT (OUTPATIENT)
Dept: FAMILY MEDICINE CLINIC | Facility: CLINIC | Age: 58
End: 2023-10-16
Payer: COMMERCIAL

## 2023-10-16 VITALS
WEIGHT: 195 LBS | RESPIRATION RATE: 16 BRPM | BODY MASS INDEX: 31.34 KG/M2 | HEART RATE: 82 BPM | DIASTOLIC BLOOD PRESSURE: 78 MMHG | HEIGHT: 66 IN | SYSTOLIC BLOOD PRESSURE: 132 MMHG

## 2023-10-16 DIAGNOSIS — R93.1 ELEVATED CORONARY ARTERY CALCIUM SCORE: Primary | ICD-10-CM

## 2023-10-16 DIAGNOSIS — E78.2 MIXED HYPERLIPIDEMIA: ICD-10-CM

## 2023-10-16 PROCEDURE — 3078F DIAST BP <80 MM HG: CPT | Performed by: NURSE PRACTITIONER

## 2023-10-16 PROCEDURE — 3075F SYST BP GE 130 - 139MM HG: CPT | Performed by: NURSE PRACTITIONER

## 2023-10-16 PROCEDURE — 3008F BODY MASS INDEX DOCD: CPT | Performed by: NURSE PRACTITIONER

## 2023-10-16 PROCEDURE — 99212 OFFICE O/P EST SF 10 MIN: CPT | Performed by: NURSE PRACTITIONER

## 2023-10-16 RX ORDER — ATORVASTATIN CALCIUM 10 MG/1
10 TABLET, FILM COATED ORAL NIGHTLY
Qty: 90 TABLET | Refills: 0 | Status: SHIPPED | OUTPATIENT
Start: 2023-10-16

## 2023-10-20 ENCOUNTER — HOSPITAL ENCOUNTER (OUTPATIENT)
Dept: CT IMAGING | Facility: HOSPITAL | Age: 58
Discharge: HOME | End: 2023-10-20
Attending: INTERNAL MEDICINE

## 2023-10-20 DIAGNOSIS — R91.8 PULMONARY NODULES: ICD-10-CM

## 2023-10-20 LAB
CREAT BLD-MCNC: 1.1 MG/DL
EGFRCR SERPLBLD CKD-EPI 2021: 59 ML/MIN/1.73M2 (ref 60–?)

## 2023-10-20 PROCEDURE — 71260 CT THORAX DX C+: CPT | Performed by: INTERNAL MEDICINE

## 2023-10-20 PROCEDURE — 82565 ASSAY OF CREATININE: CPT

## 2023-10-27 DIAGNOSIS — R91.8 PULMONARY NODULES: ICD-10-CM

## 2023-10-27 DIAGNOSIS — R05.3 CHRONIC COUGH: Primary | ICD-10-CM

## 2023-11-07 ENCOUNTER — MED REC SCAN ONLY (OUTPATIENT)
Facility: CLINIC | Age: 58
End: 2023-11-07

## 2023-11-09 ENCOUNTER — OFFICE VISIT (OUTPATIENT)
Dept: SLEEP CENTER | Age: 58
End: 2023-11-09
Attending: INTERNAL MEDICINE
Payer: COMMERCIAL

## 2023-11-09 PROCEDURE — 95810 POLYSOM 6/> YRS 4/> PARAM: CPT

## 2023-11-16 ENCOUNTER — SLEEP STUDY (OUTPATIENT)
Facility: CLINIC | Age: 58
End: 2023-11-16
Payer: COMMERCIAL

## 2023-11-16 DIAGNOSIS — G47.30 SLEEP APNEA, UNSPECIFIED TYPE: Primary | ICD-10-CM

## 2023-11-16 PROCEDURE — 95810 POLYSOM 6/> YRS 4/> PARAM: CPT | Performed by: INTERNAL MEDICINE

## 2023-11-17 ENCOUNTER — TELEPHONE (OUTPATIENT)
Facility: CLINIC | Age: 58
End: 2023-11-17

## 2023-11-17 DIAGNOSIS — G47.33 OSA (OBSTRUCTIVE SLEEP APNEA): Primary | ICD-10-CM

## 2023-12-18 ENCOUNTER — ORDER TRANSCRIPTION (OUTPATIENT)
Dept: SLEEP CENTER | Age: 58
End: 2023-12-18

## 2023-12-18 ENCOUNTER — TELEPHONE (OUTPATIENT)
Facility: CLINIC | Age: 58
End: 2023-12-18

## 2023-12-18 DIAGNOSIS — G47.33 OSA (OBSTRUCTIVE SLEEP APNEA): Primary | ICD-10-CM

## 2023-12-18 NOTE — TELEPHONE ENCOUNTER
----- Message from CHOCO Buchanan sent at 12/18/2023  9:38 AM CST -----  Regarding: CPAP denied  Dr. Hussein Reinoso,     The patient was denied her CPAP test here at the [facility]. Please reach out to this patient at [phone] for a new set up on APAP. Please contact us with any questions at [phone].       Thank Bry Bragg

## 2023-12-22 ENCOUNTER — OFFICE VISIT (OUTPATIENT)
Dept: FAMILY MEDICINE CLINIC | Facility: CLINIC | Age: 58
End: 2023-12-22
Payer: COMMERCIAL

## 2023-12-22 ENCOUNTER — APPOINTMENT (OUTPATIENT)
Dept: FAMILY MEDICINE CLINIC | Facility: CLINIC | Age: 58
End: 2023-12-22
Payer: COMMERCIAL

## 2023-12-22 VITALS
RESPIRATION RATE: 16 BRPM | WEIGHT: 196 LBS | DIASTOLIC BLOOD PRESSURE: 80 MMHG | TEMPERATURE: 97 F | HEART RATE: 96 BPM | SYSTOLIC BLOOD PRESSURE: 110 MMHG | OXYGEN SATURATION: 100 % | BODY MASS INDEX: 32 KG/M2

## 2023-12-22 DIAGNOSIS — E66.9 CLASS 1 OBESITY WITHOUT SERIOUS COMORBIDITY WITH BODY MASS INDEX (BMI) OF 31.0 TO 31.9 IN ADULT, UNSPECIFIED OBESITY TYPE: ICD-10-CM

## 2023-12-22 DIAGNOSIS — E78.2 MIXED HYPERLIPIDEMIA: ICD-10-CM

## 2023-12-22 DIAGNOSIS — Z71.3 ENCOUNTER FOR WEIGHT LOSS COUNSELING: Primary | ICD-10-CM

## 2023-12-22 DIAGNOSIS — R93.1 ELEVATED CORONARY ARTERY CALCIUM SCORE: ICD-10-CM

## 2023-12-22 DIAGNOSIS — R45.89 ANXIETY ABOUT HEALTH: ICD-10-CM

## 2023-12-22 LAB
CHOLEST SERPL-MCNC: 157 MG/DL (ref ?–200)
EST. AVERAGE GLUCOSE BLD GHB EST-MCNC: 114 MG/DL (ref 68–126)
FASTING PATIENT LIPID ANSWER: YES
HBA1C MFR BLD: 5.6 % (ref ?–5.7)
HDLC SERPL-MCNC: 57 MG/DL (ref 40–59)
LDLC SERPL CALC-MCNC: 88 MG/DL (ref ?–100)
NONHDLC SERPL-MCNC: 100 MG/DL (ref ?–130)
TRIGL SERPL-MCNC: 61 MG/DL (ref 30–149)
VIT D+METAB SERPL-MCNC: 25.3 NG/ML (ref 30–100)
VLDLC SERPL CALC-MCNC: 10 MG/DL (ref 0–30)

## 2023-12-22 PROCEDURE — 3074F SYST BP LT 130 MM HG: CPT | Performed by: NURSE PRACTITIONER

## 2023-12-22 PROCEDURE — 82306 VITAMIN D 25 HYDROXY: CPT | Performed by: NURSE PRACTITIONER

## 2023-12-22 PROCEDURE — 3079F DIAST BP 80-89 MM HG: CPT | Performed by: NURSE PRACTITIONER

## 2023-12-22 PROCEDURE — 80061 LIPID PANEL: CPT | Performed by: NURSE PRACTITIONER

## 2023-12-22 PROCEDURE — 99214 OFFICE O/P EST MOD 30 MIN: CPT | Performed by: NURSE PRACTITIONER

## 2023-12-22 PROCEDURE — 83036 HEMOGLOBIN GLYCOSYLATED A1C: CPT | Performed by: NURSE PRACTITIONER

## 2023-12-22 RX ORDER — BUPROPION HYDROCHLORIDE 150 MG/1
150 TABLET ORAL DAILY
Qty: 30 TABLET | Refills: 0 | Status: SHIPPED | OUTPATIENT
Start: 2023-12-22

## 2023-12-23 ENCOUNTER — PATIENT MESSAGE (OUTPATIENT)
Dept: FAMILY MEDICINE CLINIC | Facility: CLINIC | Age: 58
End: 2023-12-23

## 2023-12-23 DIAGNOSIS — E55.9 VITAMIN D INSUFFICIENCY: Primary | ICD-10-CM

## 2023-12-23 RX ORDER — ERGOCALCIFEROL 1.25 MG/1
50000 CAPSULE ORAL WEEKLY
Qty: 8 CAPSULE | Refills: 0 | Status: SHIPPED | OUTPATIENT
Start: 2023-12-23 | End: 2024-02-11

## 2023-12-23 NOTE — TELEPHONE ENCOUNTER
From: Simi Beasley  To: Rosalva Yu  Sent: 12/23/2023 10:37 AM CST  Subject: Blood tests from mychart    Hello, I am hoping you have access to my other test results from pulmonary department. I just wanted this to be included as I am concerned with the whole diabetic thing. I am certain by friend passing and so many of my issues may be in my head is all. In my 20's I used to get chronic kidney infections every year around the start of college. Sorry for so much in this message but just trying to put pieces together or eliminate my concerns

## 2023-12-26 ENCOUNTER — OFFICE VISIT (OUTPATIENT)
Facility: CLINIC | Age: 58
End: 2023-12-26
Payer: COMMERCIAL

## 2023-12-26 VITALS
OXYGEN SATURATION: 99 % | HEART RATE: 58 BPM | HEIGHT: 66 IN | DIASTOLIC BLOOD PRESSURE: 62 MMHG | WEIGHT: 198 LBS | BODY MASS INDEX: 31.82 KG/M2 | RESPIRATION RATE: 18 BRPM | TEMPERATURE: 98 F | SYSTOLIC BLOOD PRESSURE: 106 MMHG

## 2023-12-26 DIAGNOSIS — R91.8 PULMONARY NODULES: Primary | ICD-10-CM

## 2023-12-26 DIAGNOSIS — G47.33 OSA (OBSTRUCTIVE SLEEP APNEA): ICD-10-CM

## 2023-12-26 DIAGNOSIS — K21.9 GASTROESOPHAGEAL REFLUX DISEASE WITHOUT ESOPHAGITIS: ICD-10-CM

## 2023-12-26 PROCEDURE — 3008F BODY MASS INDEX DOCD: CPT | Performed by: INTERNAL MEDICINE

## 2023-12-26 PROCEDURE — 3074F SYST BP LT 130 MM HG: CPT | Performed by: INTERNAL MEDICINE

## 2023-12-26 PROCEDURE — 99214 OFFICE O/P EST MOD 30 MIN: CPT | Performed by: INTERNAL MEDICINE

## 2023-12-26 PROCEDURE — 3078F DIAST BP <80 MM HG: CPT | Performed by: INTERNAL MEDICINE

## 2023-12-26 NOTE — PROGRESS NOTES
Please let patient know that her pulmonary scan showed some nodules which are stable. She does have a new nodule that radiology recommends repeating imaging in 6 to 12 months. Please let patient know and ask her what she prefers to wait 12 months to reevaluate or her nodules ordered and again in 6 months given that one of them is a new finding.

## 2023-12-27 ENCOUNTER — TELEPHONE (OUTPATIENT)
Dept: FAMILY MEDICINE CLINIC | Facility: CLINIC | Age: 58
End: 2023-12-27

## 2023-12-27 DIAGNOSIS — R91.1 PULMONARY NODULE: Primary | ICD-10-CM

## 2023-12-27 NOTE — TELEPHONE ENCOUNTER
Author: Juju Sol MD Service: -- Author Type: Physician   Filed: 12/26/2023  3:03 PM Encounter Date: 12/26/2023 Status: Signed   : Juju Sol MD (Physician)     Please let patient know that her pulmonary scan showed some nodules which are stable. She does have a new nodule that radiology recommends repeating imaging in 6 to 12 months. Please let patient know and ask her what she prefers to wait 12 months to reevaluate or her nodules ordered and again in 6 months given that one of them is a new finding.

## 2023-12-27 NOTE — TELEPHONE ENCOUNTER
Advised patient of Dr. Mele Andrews note below. Patient verbalized understanding and stated plans to complete follow-up imaging in 6 months. Pt reports will need to call to schedule around April    Please place order.  Thank you

## 2024-01-01 NOTE — TELEPHONE ENCOUNTER
It appears Dr. Jones has addressed her concerns; however, can we reach out to confirm she has no additional questions?

## 2024-01-05 ENCOUNTER — TELEPHONE (OUTPATIENT)
Dept: FAMILY MEDICINE CLINIC | Facility: CLINIC | Age: 59
End: 2024-01-05

## 2024-01-05 ENCOUNTER — MED REC SCAN ONLY (OUTPATIENT)
Facility: CLINIC | Age: 59
End: 2024-01-05

## 2024-01-05 NOTE — TELEPHONE ENCOUNTER
Patient will need to contact pulm Dr Crews.   Order is in epic per pulm.    Patient notified and verbalized understanding.

## 2024-01-05 NOTE — TELEPHONE ENCOUNTER
Pt asking for CPAP order to be sent to DME so pt can obtain equipment necessary prior to school starting.     Please advise, thank you!

## 2024-01-09 DIAGNOSIS — R93.1 ELEVATED CORONARY ARTERY CALCIUM SCORE: ICD-10-CM

## 2024-01-09 DIAGNOSIS — E78.2 MIXED HYPERLIPIDEMIA: ICD-10-CM

## 2024-01-09 RX ORDER — ATORVASTATIN CALCIUM 10 MG/1
10 TABLET, FILM COATED ORAL NIGHTLY
Qty: 90 TABLET | Refills: 0 | Status: SHIPPED | OUTPATIENT
Start: 2024-01-09

## 2024-01-09 NOTE — TELEPHONE ENCOUNTER
Cholesterol Medication Protocol [id number]/09/2024 10:20 AM   Protocol Details ALT < 80    ALT resulted within past year    Lipid panel within past 12 months    Appointment within past 12 or next 3 months       LOV 12/22/23     Last Refill 10/16/23 #90 Refill 0    Labs 12/22/23  Future Appointments   Date Time Provider Department Center   4/29/2024  4:00 PM OS CT RM 1 OS CT [place]

## 2024-01-18 DIAGNOSIS — R45.89 ANXIETY ABOUT HEALTH: ICD-10-CM

## 2024-01-18 DIAGNOSIS — Z71.3 ENCOUNTER FOR WEIGHT LOSS COUNSELING: ICD-10-CM

## 2024-01-18 DIAGNOSIS — E66.9 CLASS 1 OBESITY WITHOUT SERIOUS COMORBIDITY WITH BODY MASS INDEX (BMI) OF 31.0 TO 31.9 IN ADULT, UNSPECIFIED OBESITY TYPE: ICD-10-CM

## 2024-01-19 RX ORDER — BUPROPION HYDROCHLORIDE 150 MG/1
150 TABLET ORAL DAILY
Qty: 30 TABLET | Refills: 0 | Status: SHIPPED | OUTPATIENT
Start: 2024-01-19

## 2024-01-19 NOTE — TELEPHONE ENCOUNTER
Pt failed refill protocol for the following reasons:    Name from pharmacy: BUPROPION XL 150MG TABLETS (24 H)          Will file in chart as: BUPROPION  MG Oral Tablet 24 Hr    Sig: TAKE 1 TABLET(150 MG) BY MOUTH DAILY    Disp: 30 tablet    Refills: 0 (Pharmacy requested: Not specified)    Start: 1/18/2024    Class: Normal    Non-formulary For: Encounter for weight loss counseling; Anxiety about health; Class 1 obesity without serious comorbidity with body mass index (BMI) of 31.0 to 31.9 in adult, unspecified obesity type    Last ordered: 4 weeks ago (12/22/2023) by DELIA Law    Last refill: 12/22/2023    Rx #: [id number]       To be filled at: [id number] - [place], [place] - [address] AT [address] [phone], [phone]       Last refill: 12/22/23  Last appt: 12/22/23  Next appt:   Future Appointments   Date Time Provider Department Center   4/29/2024  4:00 PM OS CT RM 1 OS CT [place]         Forward to Nurse Practitioner Rosalva Yu , please advise on refills. Thank you.

## 2024-02-17 DIAGNOSIS — E66.9 CLASS 1 OBESITY WITHOUT SERIOUS COMORBIDITY WITH BODY MASS INDEX (BMI) OF 31.0 TO 31.9 IN ADULT, UNSPECIFIED OBESITY TYPE: ICD-10-CM

## 2024-02-17 DIAGNOSIS — Z71.3 ENCOUNTER FOR WEIGHT LOSS COUNSELING: ICD-10-CM

## 2024-02-17 DIAGNOSIS — R45.89 ANXIETY ABOUT HEALTH: ICD-10-CM

## 2024-02-17 DIAGNOSIS — E55.9 VITAMIN D INSUFFICIENCY: ICD-10-CM

## 2024-02-19 RX ORDER — BUPROPION HYDROCHLORIDE 150 MG/1
150 TABLET ORAL DAILY
Qty: 30 TABLET | Refills: 0 | Status: SHIPPED | OUTPATIENT
Start: 2024-02-19

## 2024-02-19 RX ORDER — ERGOCALCIFEROL 1.25 MG/1
CAPSULE ORAL
Qty: 8 CAPSULE | Refills: 0 | Status: SHIPPED | OUTPATIENT
Start: 2024-02-19

## 2024-02-19 NOTE — TELEPHONE ENCOUNTER
Psychiatric Non-Scheduled (Anti-Anxiety) [id number]/17/2024 03:18 PM   Protocol Details In person appointment or virtual visit in the past 6 mos or appointment in next 3 mos    Depression Screening completed within the past 12 months       LOV 12/22/23     Last Refill 1/19/24 #30 Refill 0  12/23/23 #8 Refill 0         Future Appointments   Date Time Provider Department Center   4/29/2024  4:00 PM OS CT RM 1 OS CT [place]

## 2024-03-20 DIAGNOSIS — Z71.3 ENCOUNTER FOR WEIGHT LOSS COUNSELING: ICD-10-CM

## 2024-03-20 DIAGNOSIS — E66.9 CLASS 1 OBESITY WITHOUT SERIOUS COMORBIDITY WITH BODY MASS INDEX (BMI) OF 31.0 TO 31.9 IN ADULT, UNSPECIFIED OBESITY TYPE: ICD-10-CM

## 2024-03-20 DIAGNOSIS — R45.89 ANXIETY ABOUT HEALTH: ICD-10-CM

## 2024-03-21 RX ORDER — BUPROPION HYDROCHLORIDE 150 MG/1
150 TABLET ORAL DAILY
Qty: 90 TABLET | Refills: 1 | Status: SHIPPED | OUTPATIENT
Start: 2024-03-21

## 2024-03-21 NOTE — TELEPHONE ENCOUNTER
Medication(s) to Refill:   Requested Prescriptions     Pending Prescriptions Disp Refills    BUPROPION  MG Oral Tablet 24 Hr [Pharmacy Med Name: BUPROPION XL 150MG TABLETS (24 H)] 30 tablet 0     Sig: TAKE 1 TABLET(150 MG) BY MOUTH DAILY       Last Office Visit with PCP: 12/22/2023 w/nidhi/aaron 12/22/2023     Last PHQ:  PHQ-2 SCORE: 0  , done 9/19/2023         Future Appointments:  Future Appointments   Date Time Provider Department Center   4/29/2024  4:00 PM OS CT RM 1 OS CT [place]       Last Blood Pressures:  BP Readings from Last 3 Encounters:   12/26/23 106/62   12/22/23 110/80   10/16/23 132/78       Recent Labs:    Lab Results   Component Value Date    BUN 10 09/19/2023    CREATSERUM 0.89 09/19/2023    GFR 80 08/08/2017    AST 22 09/19/2023    ALT 27 09/19/2023     09/19/2023    K 4.5 09/19/2023       Lab Results   Component Value Date    T4F 1.2 09/15/2020    TSH 2.820 09/19/2023

## 2024-04-16 DIAGNOSIS — E78.2 MIXED HYPERLIPIDEMIA: ICD-10-CM

## 2024-04-16 DIAGNOSIS — R93.1 ELEVATED CORONARY ARTERY CALCIUM SCORE: ICD-10-CM

## 2024-04-16 DIAGNOSIS — E55.9 VITAMIN D INSUFFICIENCY: ICD-10-CM

## 2024-04-17 RX ORDER — ATORVASTATIN CALCIUM 10 MG/1
10 TABLET, FILM COATED ORAL NIGHTLY
Qty: 90 TABLET | Refills: 2 | Status: SHIPPED | OUTPATIENT
Start: 2024-04-17

## 2024-04-17 RX ORDER — ERGOCALCIFEROL 1.25 MG/1
CAPSULE ORAL
Qty: 8 CAPSULE | Refills: 0 | OUTPATIENT
Start: 2024-04-17

## 2024-04-19 ENCOUNTER — PATIENT MESSAGE (OUTPATIENT)
Dept: FAMILY MEDICINE CLINIC | Facility: CLINIC | Age: 59
End: 2024-04-19

## 2024-04-20 NOTE — TELEPHONE ENCOUNTER
From: Simi Beasley  To: Rosalva Yu  Sent: 4/19/2024 5:32 PM CDT  Subject: Extreme lower back pain and constipation     Hello, I am reaching out because I am barely able to walk straight and have a lot of pain in my lower right part of my back and lower gut. I get shooting pains and cramping where it makes me cry. I can tolerate a lot of pain but this is enough.   I have also been dealing with chronic constipation for years and it is worse. I have not gone number two in over a week. Please help schedule what I need asap. I am worried kidneys, gallbladder or worse.   Thank you.   [email or web address]   [phone]

## 2024-04-20 NOTE — TELEPHONE ENCOUNTER
Pt reports Hx chronic constipation  Last bowel movement over a week  Only \"blueberry\" size all week  Yesterday a lot of straining  Took 4 dulcolax last night, Home remedy - eating olive oil before meals, Takes miralax daily    Back now hurting for past couple days  Thought was yard work  But figured has not gone to bathroom for long time  When standing up straight - Sharp right side lower back  Has used Heating pad, biofreeze    Rates as severe pain - pt states she has \"high tolerance for pain\"  Severe when moving - lower back resonates to outer right hand side  Pt reports hx kidney stones, Drinks lots of water  Pt reports her colleague mentioned possible issues w/ gallbladder    Pt currently in [place] - drove up last night  Pt reports currently on toilet with feet up stool, straining    Advised pt to go to local IC/ER for evaluation at this time  Pt is currently out of state, unable to offer video visit - pt v/u    Pt asking if ok to wait until she comes back to [place] on Monday  Advised pt due to history and symptoms - recommend going to IC/ER for evaluation - pt v/u and asking for recommendations for next steps  Advised pt to obtain records of her visit to send to our office for provider to review, pt to schedule follow-up appt - pt v/u  No further questions at this time    Routing to PCP as MONIQUE only

## 2024-04-26 ENCOUNTER — MED REC SCAN ONLY (OUTPATIENT)
Facility: CLINIC | Age: 59
End: 2024-04-26

## 2024-04-29 ENCOUNTER — HOSPITAL ENCOUNTER (OUTPATIENT)
Dept: CT IMAGING | Age: 59
Discharge: HOME | End: 2024-04-29
Attending: INTERNAL MEDICINE
Payer: COMMERCIAL

## 2024-04-29 DIAGNOSIS — R05.3 CHRONIC COUGH: ICD-10-CM

## 2024-04-29 PROCEDURE — 71250 CT THORAX DX C-: CPT | Performed by: INTERNAL MEDICINE

## 2024-04-30 ENCOUNTER — TELEPHONE (OUTPATIENT)
Dept: FAMILY MEDICINE CLINIC | Facility: CLINIC | Age: 59
End: 2024-04-30

## 2024-04-30 ENCOUNTER — PATIENT MESSAGE (OUTPATIENT)
Facility: CLINIC | Age: 59
End: 2024-04-30

## 2024-04-30 NOTE — TELEPHONE ENCOUNTER
[facility]    [address] [place] [address]    Phone:  [phone]    Fax:  [phone]       Thank You for choosing us for your health care visit. We are glad to serve you and happy to provide you with this summary of your visit. Please help us to ensure we have accurate records. If you find anything that needs to be changed, please let our staff know as soon as possible.          Your Demographic Information     Patient Name Sex Layne Avelar Female 1955       Ethnic Group Patient Race    Not of  or  Origin White      Your Visit Details     Date & Time Provider Department    2017 10:30 AM Car Yeboah MD [facility]      Your Upcoming Appointment*(Max 10)     Wednesday May 10, 2017  8:45 AM CDT   Office Visit with Louis Chacko MD   [facility] ([facility])    [address] [place] [address]   [phone]            Thursday May 11, 2017  9:15 AM CDT   Echo with [facility] ECHO   [facility] - Ultrasound ([facility])    [address]  [place] [id number] [phone]            Thursday May 11, 2017  2:30 PM CDT   Lab Visit with [facility] VL LAB   [facility] [place] [facility] ([facility])    [address]  [place] [id number] [phone]            Thursday May 11, 2017  3:00 PM CDT   Follow-up Visit with Danilo Darnell MD   [facility] [place] [facility] ([facility])    [address]  [place] [id number] [phone]            2017  9:45 AM CDT   Follow-up Visit with Car Yeboah MD   [facility] ([facility] [place] [facility])    [address]  ----- Message from Suni Ruano MD sent at 4/30/2024 10:23 AM CDT -----  Pt has a few scattered pulmonary nodules and I would recommend she sense a lung specialist given she chronic cough please check if ok to refer. BRENDAN cuadra you   Dr Nielson [address] [place] [address]   [phone]              Your To Do List     Future Orders Please Complete On or Around Expires    BASIC METABOLIC PANEL  May 02, 2017 (Approximate) May 05, 2017    ECHO M-MODE/2D/DOPPLER (ROUTINE)  May 02, 2017 May 02, 2018    Follow-Up    Return in about 2 months (around 2017).      We Ordered or Performed the Following     ELECTROCARDIOGRAM 12-LEAD       Conditions Discussed Today or Order-Related Diagnoses        Comments    Essential hypertension    -  Primary     Mixed hyperlipidemia         Gastroesophageal reflux disease without esophagitis         Abnormal ECG           Your Vitals Were     BP Pulse Height Weight SpO2 BMI    142/100 (BP Location: [facility], Patient Position: Sitting, Cuff Size: Large Adult) 86 5' 7.5\" (1.715 m) 195 lb 6.4 oz (88.6 kg) 100% 30.15 kg/m2    Smoking Status                   Former Smoker           Medications Prescribed or Re-Ordered Today     None      Your Current Medications Are        Disp Refills Start End    amoxicillin (AMOXIL) 500 MG capsule 4 capsule 1 2017     Sig: Take a total of 2000mg one hour prior to dental work    Class: Eprescribe    Cosign for Ordering: Accepted by Vincent Diaz MD on 2017 11:09 AM    metoCLOPramide (REGLAN) 10 MG tablet 90 tablet 1 2017     Sig - Route: Take 1 tablet by mouth 3 times daily as needed (Nausea and bloating). - Oral    Class: Eprescribe    electrolyte/PEG 3350, 4 flavor pkts, (GOLYTELY) 236 g suspension 1 Bottle 0 2017     Si gallon as directed on Colonoscopy Instructions    Class: Eprescribe    cholecalciferol (VITAMIN D3) 1000 UNITS tablet        Sig - Route: Take 1,000 Units by mouth daily. - Oral    Class: Historical Med    naproxen sodium (ALEVE) 220 MG tablet        Sig - Route: Take 220 mg by mouth 2 times daily (with meals). - Oral    Class: Historical Med    venlafaxine XR (EFFEXOR XR) 150 MG 24 hr capsule 30 capsule 3 2017     Sig - Route: Take 1 capsule  by mouth daily. Take with 75 mg daily for a total of 225 mg daily - Oral    Class: Eprescribe    venlafaxine XR (EFFEXOR XR) 75 MG 24 hr capsule 30 capsule 3 4/7/2017     Sig - Route: Take 1 capsule by mouth daily. Take with 150 mg daily for a total of 225 mg daily. - Oral    Class: Eprescribe    gabapentin (NEURONTIN) 100 MG capsule 90 capsule 3 4/7/2017     Sig: Take 1 tablet daily x 1 week, then 1 tablet twice daily x 1 week, then 1 tablet three times daily    Class: Eprescribe    oxybutynin (DITROPAN-XL) 10 MG 24 hr tablet 90 tablet 3 10/28/2016     Sig - Route: Take 1 tablet by mouth daily. - Oral    Class: Eprescribe    fenofibrate 160 MG tablet 90 tablet 3 10/28/2016     Sig - Route: Take 1 tablet by mouth daily. - Oral    Class: Eprescribe    Probiotic Product (PROBIOTIC DAILY PO)        Sig - Route: Take 1 capsule by mouth daily.  - Oral    Class: Historical Med    fish oil-omega-3 fatty acids 1000 MG CAPS        Sig - Route: Take 1 capsule by mouth daily. - Oral    Class: Historical Med      Allergies     Epinephrine Other (See Comments)    Tachycardia    Statins MYALGIA    Gemfibrozil [Gemcor] GI UPSET    Sulfa Antibiotics RASH      Immunizations History as of 5/2/2017     Name Date    HERPES ZOSTER SHINGLES 10/28/2016    INFLUENZA QUADRIVALENT 10/28/2016, 10/22/2014    Influenza 10/16/2013    Influenza Quadrivalent Preservative Free 11/6/2015    Tdap 10/16/2013      Problem List as of 5/2/2017     Impaired fasting glucose    Vitamin B 12 deficiency    Iron deficiency anemia    Hyperlipidemia    GERD (gastroesophageal reflux disease)    Chronic back pain    Urge incontinence    Lobular carcinoma of breast    Osteoarthritis of foot    Fibromyalgia    Depression    Bilateral presbyopia    Myopia of left eye    Hyperopia    Astigmatism of both eyes    Chronic pain in right foot    PONV (postoperative nausea and vomiting)    Osteoarthritis of left knee    Chondrocalcinosis of knee    Pseudophakia of left eye     Pseudophakia of right eye      Results of Testing Done Today     ELECTROCARDIOGRAM 12-LEAD                   Patient Instructions     None

## 2024-04-30 NOTE — TELEPHONE ENCOUNTER
Patient notified and verbalized understanding.   Patient states she has pulmonologist and will follow up with their office.    Patient states she saw Rosalva in the past and needs to schedule f/u  Future Appointments   Date Time Provider Department Center   5/6/2024  3:40 PM Rosalva Yu APRN [facility] [place]

## 2024-05-03 NOTE — TELEPHONE ENCOUNTER
Per Dr. Crews: Please arrange an office visit for further discussion about pulmonary nodules and sleep. Location of pulmonary nodules does not suggest that it could cause chest pains.      MCM sent to pt with the information above per MD. MD agrees to see pt in June. Message stated that based on MD's message pain doesn't seem to be pulm related. Pt advised to contact pcp to see if she would like to order any tests/labs. Pt advised to call or send us a message to discuss follow up appt in June.

## 2024-05-03 NOTE — TELEPHONE ENCOUNTER
Spoke with pt. States she experiences sharp pain periodically. Sometimes from the left side other times from right side of chest. Unsure if its pulmonary related or cardiac. Doesn't want to overlook. Pt willing to come in next week if MD considers it necessary or if she needs to see another specialist, wants to know what MD recommends. If not urgent, she would prefer to come in the first week of June. TE routed to Dr. Crews.

## 2024-05-03 NOTE — TELEPHONE ENCOUNTER
Pt called to discuss symptoms and to make her aware we are working on getting her scheduled with Dr. Crews as soon as possible. LVM to call back and MCM sent making her aware of the above.

## 2024-05-06 ENCOUNTER — OFFICE VISIT (OUTPATIENT)
Dept: FAMILY MEDICINE CLINIC | Facility: CLINIC | Age: 59
End: 2024-05-06
Payer: COMMERCIAL

## 2024-05-06 VITALS
HEART RATE: 68 BPM | BODY MASS INDEX: 31.66 KG/M2 | HEIGHT: 66 IN | TEMPERATURE: 98 F | WEIGHT: 197 LBS | DIASTOLIC BLOOD PRESSURE: 80 MMHG | RESPIRATION RATE: 16 BRPM | SYSTOLIC BLOOD PRESSURE: 138 MMHG | OXYGEN SATURATION: 98 %

## 2024-05-06 DIAGNOSIS — K59.09 OTHER CONSTIPATION: Primary | ICD-10-CM

## 2024-05-06 DIAGNOSIS — R07.9 LEFT-SIDED CHEST PAIN: ICD-10-CM

## 2024-05-06 DIAGNOSIS — R41.840 DIFFICULTY CONCENTRATING: ICD-10-CM

## 2024-05-06 DIAGNOSIS — F32.89 OTHER DEPRESSION: ICD-10-CM

## 2024-05-06 PROCEDURE — 3008F BODY MASS INDEX DOCD: CPT | Performed by: NURSE PRACTITIONER

## 2024-05-06 PROCEDURE — 3079F DIAST BP 80-89 MM HG: CPT | Performed by: NURSE PRACTITIONER

## 2024-05-06 PROCEDURE — 3075F SYST BP GE 130 - 139MM HG: CPT | Performed by: NURSE PRACTITIONER

## 2024-05-06 PROCEDURE — 99214 OFFICE O/P EST MOD 30 MIN: CPT | Performed by: NURSE PRACTITIONER

## 2024-05-06 PROCEDURE — 93000 ELECTROCARDIOGRAM COMPLETE: CPT | Performed by: NURSE PRACTITIONER

## 2024-05-06 NOTE — PROGRESS NOTES
CHIEF COMPLAINT:    Chief Complaint   Patient presents with    Fatigue     Discuss: fatigue, always being hungry, medication follow up, left side tightness in chest       HISTORY OF PRESENT ILLNESS:    Simi presents today, May 06, 2024, to follow-up on fatigue and mental health as well as two additional concerns including GI concerns/constipation and chest pain.    Simi has not experienced a significant reduction of fatigue and difficulty focusing after starting CPAP therapy.  Positive for increased stress.  Denies thoughts of self harm or harming others.  Agreeable to follow-up with [facility] to optimize treatment plan regarding anxiety, depression, and difficulty concentrating.    Intermittent sharp chest pains.  Left sided chest pain, sharp, tightening.  Involves right side intermittently.  Pain lasts for approximately 30 seconds to a minute.  Denies palpitations or near fainting.  EKG today NSR, normal ECG    Constipation, two year history of narrow stools, comparable to the size of raisin and green beans.  Denies unintentional weight loss or blood in stools.  Simi is agreeable to follow-up with gastro specialist for further workup and guidance.    ALLERGIES:  Allergies   Allergen Reactions    Bees SWELLING    Eddy OTHER (SEE COMMENTS)     Allergy testing    Shrimp OTHER (SEE COMMENTS)     Allergy Testing       CURRENT MEDICATIONS:  Current Outpatient Medications   Medication Sig Dispense Refill    atorvastatin 10 MG Oral Tab TAKE 1 TABLET(10 MG) BY MOUTH EVERY NIGHT 90 tablet 2    buPROPion  MG Oral Tablet 24 Hr TAKE 1 TABLET(150 MG) BY MOUTH DAILY 90 tablet 1    ibuprofen (ADVIL) 200 MG Oral Tab Take 1 tablet (200 mg total) by mouth in the morning and 1 tablet (200 mg total) before bedtime.      fluticasone propionate 50 MCG/ACT Nasal Suspension 2 sprays by Each Nare route daily. 1 each 0    EPINEPHrine 0.3 MG/0.3ML Injection Solution Auto-injector       Polyethylene Glycol 3350 (MIRALAX OR) Take by  mouth.      Artificial Tear Solution (ARTIFICIAL TEARS OP) 1 Application.      multivitamin Oral Tab Take 1 tablet by mouth daily.         MEDICAL HISTORY:  Past Medical History:    ALCOHOL USE    Allergic rhinitis    Anxiety    GERD (gastroesophageal reflux disease)    Hyperlipidemia    Migraines    Obesity    Pneumonia due to organism    Sleep apnea    Thyroid disease     Past Surgical History:   Procedure Laterality Date    Cataract      Colectomy      Colonoscopy N/A 10/09/2017    Procedure: COLONOSCOPY, POSSIBLE BIOPSY, POSSIBLE POLYPECTOMY 10516;  Surgeon: Cyrus Kunz MD;  Location: [facility]    Colonoscopy N/A 10/11/2017    Procedure: COLONOSCOPY, POSSIBLE BIOPSY, POSSIBLE POLYPECTOMY 32838;  Surgeon: Cyrus Kunz MD;  Location: [facility]    Colonoscopy,diagnostic N/A 2015    Procedure: COLONOSCOPY, POSSIBLE BIOPSY, POSSIBLE POLYPECTOMY 69839;  Surgeon: Cyrus Kunz MD;  Location: [facility]    Hemorrhoidectomy      Hernia surgery            Other surgical history      colonoscopy    Other surgical history   ()    colon resection--\"intestine was nicked\" during initial surgery, had to go back in to repair a few days later    Other surgical history      hernia repair with mesh (a few years after colon resection surgeries)     Family History   Problem Relation Age of Onset    Other (Other) Mother         RA    Prostate Cancer Father     Other (Other) Sister         RA    Breast Cancer Maternal Grandmother 20        estimate    Breast Cancer Paternal Grandmother 60    Cancer Maternal Aunt         great aunt breast cancer    Breast Cancer Maternal Aunt 60    Cancer Paternal Aunt         lupectomy ? breast CA?    Breast Cancer Paternal Cousin Female 35    Breast Cancer Paternal Cousin Female 30    Breast Cancer Paternal Cousin Female 40        estimates     Family Status   Relation Status    Mo Alive    Fa     Sis Alive    MGMA     PGMA     Lindsay  Alive    Son Alive    Mat Aunt     Pat Aunt (Not Specified)    Pat Cous Fem Alive    Pat Cous Fem Alive    Pat Cous Fem Alive     Social History     Socioeconomic History    Marital status:    Occupational History     Employer: [employer]     Comment:    Tobacco Use    Smoking status: Former     Current packs/day: 0.00     Types: Cigarettes     Start date: 2008     Quit date: 2009     Years since quitting: 15.3    Smokeless tobacco: Never    Tobacco comments:     non-smoker   Vaping Use    Vaping status: Never Used   Substance and Sexual Activity    Alcohol use: Yes     Comment: occasionally </=7    Drug use: No    Sexual activity: Yes     Partners: Female     Social Determinants of Health      [facility]    Housing Stability       ROS:  GENERAL:  Denies recorded temperatures greater than 100.5F  RESPIRATORY:  Denies difficulty breathing  CARDIAC:  Denies chest pain with exertion    VITALS:   /80   Pulse 68   Temp 98.3 °F (36.8 °C) (Temporal)   Resp 16   Ht 5' 6\" (1.676 m)   Wt 197 lb (89.4 kg)   SpO2 98%   BMI 31.80 kg/m²    Reviewed by Rosalva Yu MS, APRN, FNP-BC    PHYSICAL EXAM:    Constitutional:       Appears well.  Sitting upright on exam table.  Well developed, well nourished, and in no acute distress  HEENT:      Facial features symmetric. Normocephalic and atraumatic  Cardiovascular:      Heart sounds: Regular rate and rhythm without murmur      No edema of BLE  Pulmonary:      Chest expansion symmetric.  Breathing nonlabored. Lungs clear throughout     No cough.  Musculoskeletal:         Movements smooth and controlled with appropriate coordination.       Gait is steady, nonantalgic.  Neuro:       No focal deficits, cranial nerves grossly intact.       Movements smooth and controlled, appropriate coordination without ataxia or tremors.  Skin:     Warm and dry without  jaundice or rashes.  Psychiatric:         Alert and oriented.  Calm and cooperative.  Speech is clear.     ASSESSMENT & PLAN:    1. Other constipation  Establish with gastro regarding chronic constipation  - Gastro Referral - In Network    2. Left-sided chest pain  - EKG with interpretation and Report -IN OFFICE [36563]  Negative reading    3. Other depression  Establish with psychiatry team to discuss more effective medication for depression/anxiety, difficulty concentrating, and binge eating/cravings  - [facility] Referral - In Network    4. Difficulty concentrating  Establish with psychiatry team to discuss more effective medication for depression/anxiety, difficulty concentrating, and binge eating/cravings  - [facility] Referral - In Network

## 2024-05-08 LAB
ATRIAL RATE: 66 BPM
P AXIS: 10 DEGREES
P-R INTERVAL: 152 MS
Q-T INTERVAL: 430 MS
QRS DURATION: 92 MS
QTC CALCULATION (BEZET): 450 MS
R AXIS: -4 DEGREES
T AXIS: 46 DEGREES
VENTRICULAR RATE: 66 BPM

## 2024-05-10 ENCOUNTER — TELEPHONE (OUTPATIENT)
Age: 59
End: 2024-05-10

## 2024-05-10 NOTE — TELEPHONE ENCOUNTER
Dr. Randell Hughes for testing and Hannah Hyde, EMDR therapist at [address] [phone]    SINDY Cavazos [facility]  [address] [place] [id number] [phone]    [facility]  [address] [place] [address]  [phone]

## 2024-05-31 DIAGNOSIS — Z91.030 ALLERGY TO BEE STING: Primary | ICD-10-CM

## 2024-05-31 RX ORDER — EPINEPHRINE 0.3 MG/.3ML
0.3 INJECTION SUBCUTANEOUS AS NEEDED
Qty: 1 EACH | Refills: 1 | Status: SHIPPED | OUTPATIENT
Start: 2024-05-31 | End: 2024-05-31

## 2024-05-31 RX ORDER — EPINEPHRINE 0.3 MG/.3ML
0.3 INJECTION SUBCUTANEOUS AS NEEDED
Qty: 1 EACH | Refills: 1 | Status: SHIPPED | OUTPATIENT
Start: 2024-05-31

## 2024-05-31 NOTE — TELEPHONE ENCOUNTER
Patient comment: I have been stung this week and arm swelled up pretty good.  I also had minor swelling in sinuses     LOV 05/06/24  Last refill on 08/14/20, for #1 each, with 1 refills  EPINEPHrine 0.3 MG/0.3ML Injection Solution Auto-injector     Future Appointments   Date Time Provider Department Center   6/28/2024  9:00 AM Minnie Banks APRN [id number]     Order(s) pending, please review. Thank you.

## 2024-06-03 PROBLEM — F90.2 ADHD (ATTENTION DEFICIT HYPERACTIVITY DISORDER), COMBINED TYPE: Status: ACTIVE | Noted: 2024-06-03

## 2024-06-03 PROBLEM — F41.9 ANXIETY DISORDER: Status: ACTIVE | Noted: 2024-06-03

## 2024-06-04 DIAGNOSIS — M35.00 SJOGREN'S SYNDROME WITHOUT EXTRAGLANDULAR INVOLVEMENT (HCC): Primary | ICD-10-CM

## 2024-06-04 NOTE — TELEPHONE ENCOUNTER
No refill protocol for this medication.    Last refill: Never filled here, placed as historical  Last Visit: 5/6/2024   Next Visit:   No Future Appointments         Forward to DELIA Law please advise on refills. Thanks.

## 2024-06-05 RX ORDER — BRIMONIDINE TARTRATE 0.25 MG/ML
1 SOLUTION/ DROPS OPHTHALMIC AS DIRECTED
Refills: 0 | Status: CANCELLED | OUTPATIENT
Start: 2024-06-05

## 2024-06-05 NOTE — TELEPHONE ENCOUNTER
Please call Simi to inquire about who typically refills her eye drops    Sjogren's syndrome without extraglandular involvement (HCC)  Primary Sjogren's syndrome (HCC)

## 2024-06-07 NOTE — TELEPHONE ENCOUNTER
Advised patient of Rosalva LIN's note below. Patient verbalized understanding.  Pt reports no one has actually prescribed this medication before  Pt reports she purchases this OTC   Pt does use this everyday due to dry eyes from Dx Sjogren's syndrome    Pt states she saw the eye drop in her Mychart and was given option to request refill    Pt figured she would try and request this through her insurance. If cost is the same, pt will continue to purchase.  No further questions at this time.    Order(s) pending, please review. Thank you.  Oneida Bray

## 2024-06-08 NOTE — TELEPHONE ENCOUNTER
I recommend she reaches out to eye doctor to confirm this would be the best option for her because of her medical history of sjogren's    I'm happy to prescribe; however, there may be a better and more effective option out there

## 2024-06-11 ENCOUNTER — TELEPHONE (OUTPATIENT)
Dept: FAMILY MEDICINE CLINIC | Facility: CLINIC | Age: 59
End: 2024-06-11

## 2024-06-11 DIAGNOSIS — Z12.39 ENCOUNTER FOR SCREENING BREAST EXAMINATION: Primary | ICD-10-CM

## 2024-06-11 DIAGNOSIS — Z12.31 ENCOUNTER FOR SCREENING MAMMOGRAM FOR BREAST CANCER: ICD-10-CM

## 2024-06-28 ENCOUNTER — TELEPHONE (OUTPATIENT)
Dept: FAMILY MEDICINE CLINIC | Facility: CLINIC | Age: 59
End: 2024-06-28

## 2024-06-28 ENCOUNTER — TELEPHONE (OUTPATIENT)
Facility: CLINIC | Age: 59
End: 2024-06-28

## 2024-06-28 NOTE — TELEPHONE ENCOUNTER
Patient wanted to double check that normal screening mammogram is appropriate     Last year, she needs additional views and ultrasound    Order has already been placed but she wanted to double check    Please adv  Thank you

## 2024-06-28 NOTE — TELEPHONE ENCOUNTER
Diagnostic mammogram completed 06/09/23  \"RECOMMENDATIONS:    ROUTINE MAMMOGRAM AND CLINICAL EVALUATION IN 12 MONTHS\"    Kami Terry, APRN  6/10/2023  8:38 AM CDT       Results reviewed. Diagnostic mammogram benign. Repeat screening mammogram in 1 year       Advised patient of notes above. Patient verbalized understanding.  Pt stated currently out of state and plans to complete mammogram in a few weeks when back in [place]. Pt denies any new breast related concerns. No further questions at this time.

## 2024-07-16 ENCOUNTER — HOSPITAL ENCOUNTER (OUTPATIENT)
Dept: MAMMOGRAPHY | Age: 59
Discharge: HOME | End: 2024-07-16
Attending: FAMILY MEDICINE
Payer: COMMERCIAL

## 2024-07-16 DIAGNOSIS — Z12.31 ENCOUNTER FOR SCREENING MAMMOGRAM FOR BREAST CANCER: ICD-10-CM

## 2024-07-16 PROCEDURE — 77067 SCR MAMMO BI INCL CAD: CPT | Performed by: FAMILY MEDICINE

## 2024-07-16 PROCEDURE — 77063 BREAST TOMOSYNTHESIS BI: CPT | Performed by: FAMILY MEDICINE

## 2024-07-17 ENCOUNTER — OFFICE VISIT (OUTPATIENT)
Facility: CLINIC | Age: 59
End: 2024-07-17
Payer: COMMERCIAL

## 2024-07-17 VITALS
OXYGEN SATURATION: 87 % | WEIGHT: 190 LBS | TEMPERATURE: 99 F | SYSTOLIC BLOOD PRESSURE: 118 MMHG | HEIGHT: 66 IN | RESPIRATION RATE: 18 BRPM | DIASTOLIC BLOOD PRESSURE: 72 MMHG | BODY MASS INDEX: 30.53 KG/M2 | HEART RATE: 78 BPM

## 2024-07-17 DIAGNOSIS — G47.33 OSA (OBSTRUCTIVE SLEEP APNEA): Primary | ICD-10-CM

## 2024-07-17 DIAGNOSIS — F90.2 ADHD (ATTENTION DEFICIT HYPERACTIVITY DISORDER), COMBINED TYPE: ICD-10-CM

## 2024-07-17 DIAGNOSIS — G47.19 DAYTIME HYPERSOMNOLENCE: ICD-10-CM

## 2024-07-17 PROCEDURE — 3074F SYST BP LT 130 MM HG: CPT | Performed by: INTERNAL MEDICINE

## 2024-07-17 PROCEDURE — 99215 OFFICE O/P EST HI 40 MIN: CPT | Performed by: INTERNAL MEDICINE

## 2024-07-17 PROCEDURE — 3008F BODY MASS INDEX DOCD: CPT | Performed by: INTERNAL MEDICINE

## 2024-07-17 PROCEDURE — 3078F DIAST BP <80 MM HG: CPT | Performed by: INTERNAL MEDICINE

## 2024-07-17 RX ORDER — FAMOTIDINE 40 MG/1
40 TABLET, FILM COATED ORAL 2 TIMES DAILY
COMMUNITY

## 2024-07-17 NOTE — PROGRESS NOTES
This is a 58 year old female who presents with the following symptoms, risk factors, behaviors or other items associated with sleep problems.    Sleep Apnea:   snoring; reflux during sleep; stops breathing; wakes with headache; wakes with dry mouth; nasal congestion; non-refreshing sleep; overweight; postmenopausal; previous sleep study; current CPAP use  Insomnia:  difficulty falling asleep; non-refreshing sleep; restless sleep; not getting enough sleep; irregular sleep schedule; mind racing; anxiety  Restless Leg:  tingling or crawly feeling in the legs; symptoms are worse in the evening or at bedtime  Parasomnias:   very restless sleep; teeth grinding  Daytime Problems:  sleepiness; fatigue; irritable/stearns; dificulty concentrating; memory problems; previous sleep study    The patient's [place] Sleepiness score is 6/24.    Usage 04/18/2024 - 07/16/2024  Usage days 75/90 days (83%)  >= 4 hours 70 days (78%)  < 4 hours 5 days (6%)  Usage hours 472 hours 27 minutes  Average usage (total days) 5 hours 15 minutes  Average usage (days used) 6 hours 18 minutes  Median usage (days used) 6 hours 44 minutes  Total used hours (value since last reset - 07/16/2024) 1,155 hours  AirSense 10 AutoSet  Serial number [id number]  Mode AutoSet  Min Pressure 5 cmH2O  Max Pressure 15 cmH2O  EPR Fulltime  EPR level 3  Response Standard  Therapy  Pressure - cmH2O Median: 6.3 95th percentile: 8.3 Maximum: 9.5  Leaks - L/min Median: 0.2 95th percentile: 10.1 Maximum: 20.2  Events per hour AI: 1.7 HI: 0.0 AHI: 1.7  Apnea Index Central: 0.4 Obstructive: 1.2 Unknown: 0.0  RERA Index 0.0  Cheyne-Munoz respiration (average duration per night) 0 minutes (0%)  DME [facility]  MASK AIRFIT N30 small

## 2024-07-17 NOTE — PATIENT INSTRUCTIONS
Plan:    Continue  auto-titrating CPAP at 5-20 cwp at bedtime as the patient is using and benefiting from it   Advised about weight loss   Advised patient to sleep 7-8 hours to achieve full rest and be more awake during the daytime   GERD precautions   Advised against drowsy driving and to avoid alcoholic beverage and respiratory depressants as these may worsen sleep apnea  Follow up CT chest to evaluate pulmonary nodules  around 1/2025      Follow up: 8 months       Tadeo Crews MD      Obstructive Sleep Apnea  Obstructive sleep apnea is a condition caused by air passages becoming narrowed or blocked during sleep. As a result, breathing stops for short periods. Your body wakes up enough for breathing to start again. But you don't remember it. The cycle of stopped breathing and brief awakenings can repeat dozens of times a night. This prevents the body from getting to the deeper stages of sleep that are needed for good rest.   Signs of sleep apnea include loud snoring, noisy breathing, and gasping sounds during sleep. People with sleep apnea often find they use the bathroom many times during the night. Daytime symptoms include waking up tired after a full night's sleep and waking up with headaches. They can also include feeling very sleepy or falling asleep during the day, and having problems with memory or concentration.   Risk factors for sleep apnea include:  Being overweight  Being assigned male at birth, or being in menopause  Smoking  Using alcohol or sedating medicines  Having enlarged structures in the nose or throat such as enlarged tonsils or adenoids, or extra tissue in the airway  Home care  Lifestyle changes that can help treat snoring and sleep apnea include:   If you're overweight, talk with your healthcare provider about a weight-loss plan for you.  Don't drink alcohol for 3 to 4 hours before bedtime.  Don't take sedating medicines. Ask your healthcare provider about the medicines  you take.  If you smoke, talk to your provider about ways to quit. It's important to stay away from secondhand smoke. Don't use e-cigarettes because of their harmful side effects.  Sleep on your side. This can help prevent gravity from pulling relaxed throat tissues into your breathing passages.  If you have allergies or sinus problems that block your nose, ask your provider for help.  Use positive airway pressure (PAP). Discuss with your provider the benefits of using PAP at home. And talk about the type of PAP that's best for you.  Follow-up care  Follow up with your healthcare provider, or as advised. A diagnosis of sleep apnea is made with a sleep study. Your provider can tell you more about this test.   When to get medical care  See your healthcare provider if you have daytime symptoms of sleep apnea. These include:   Waking up tired after a full night's sleep  Waking up with a headache  Feeling very sleepy or falling asleep during the day  Having problems with memory or concentration  Also talk with your provider if your partner tells you that you snore, gasp for air, or stop breathing while you sleep.   Seeing your provider is important because sleep apnea can make you more likely to have certain health problems. These include high blood pressure, heart attack, stroke, and sexual dysfunction. If you have sleep apnea, talk with your healthcare provider about the best treatments for you.   Kelsie last reviewed this educational content on 5/1/2022 © 2000-2023 [employer]. All rights reserved. This information is not intended as a substitute for professional medical care. Always follow your healthcare professional's instructions.        Continuous Positive Air Pressure (CPAP)     A mask over the nose gently directs air into the throat to keep the airway open.     Continuous positive air pressure (CPAP) uses gentle air pressure to hold the airway open. CPAP is often the most effective treatment  for sleep apnea. It works very well as a treatment for adults diagnosed with obstructive sleep apnea with a lot of sleepiness. But keep in mind that it can take several adjustments before the setup is right for you.   How CPAP works  The CPAP machine  is a small portable pump that sits beside the bed. The pump sends air through a hose, which is held over your nose alone, or nose and mouth by a mask. Mild air pressure is gently pushed through your airway. The air pressure nudges sagging tissues aside. This widens the airway so you can breathe better. CPAP may be combined with other kinds of therapy for sleep apnea.   Types of air pressure treatments  There are different types of CPAP. Your doctor or CPAP technician will help you decide which type is best for you:   Basic CPAP keeps the pressure constant all night long.  A bilevel device (BiPAP) provides more pressure when you breathe in and less when you breathe out. A BiPAP machine also may be set to provide automatic breaths to maintain breathing if you stop breathing while sleeping.  An autoCPAP device automatically adjusts pressure throughout the night and in response to changes such as body position, sleep stage, and snoring.  [employer] last reviewed this educational content on 7/1/2019  © 3320-1897 [employer]. All rights reserved. This information is not intended as a substitute for professional medical care. Always follow your healthcare professional's instructions.

## 2024-07-17 NOTE — PROGRESS NOTES
Pulmonary/Critical Care/Sleep Medicine   Progress Note     PCP: Suni Ruano MD   Phone: [phone]   Fax: [phone]          Chief Complaint   Patient presents with    Follow - Up     Here for . Struggling with the type of mask that she uses       HPI  I had the pleasure of seeing Simi Beasley who is a pleasant 58 year old female who presents for follow up  of Sleep Apnea after visit on 2024        The patient reports using  autoCPAP daily at night at 5-15 cmH2O regularly throughout the night for about 6 hours and states that the  PAP machine is quiet and has a heated humidifier.  The patient still has daytime hypersomnolence or fatigue.      She drinks 2 cups of caffeine coffee daily     She has pets 1 dog          Hx of tobacco use: She  reports that she quit smoking about 15 years ago. Her smoking use included cigarettes. She started smoking about 16 years ago. She has a 0.3 pack-year smoking history. She has never used smokeless tobacco.    Past Medical History:    ALCOHOL USE    Allergic rhinitis    Anxiety    GERD (gastroesophageal reflux disease)    Hyperlipidemia    Migraines    Obesity    Pneumonia due to organism    Sleep apnea    Thyroid disease      Past Surgical History:   Procedure Laterality Date    Cataract      Colectomy      Colonoscopy N/A 10/09/2017    Procedure: COLONOSCOPY, POSSIBLE BIOPSY, POSSIBLE POLYPECTOMY 14410;  Surgeon: Cyrus Kunz MD;  Location: [facility]    Colonoscopy N/A 10/11/2017    Procedure: COLONOSCOPY, POSSIBLE BIOPSY, POSSIBLE POLYPECTOMY 80949;  Surgeon: Cyrus Kunz MD;  Location: [facility]    Colonoscopy,diagnostic N/A 2015    Procedure: COLONOSCOPY, POSSIBLE BIOPSY, POSSIBLE POLYPECTOMY 05723;  Surgeon: Cyrus Kunz MD;  [facility]    Hemorrhoidectomy      Hernia surgery            Other surgical history      colonoscopy    Other surgical history   (guestimate)    colon resection--\"intestine  was nicked\" during initial surgery, had to go back in to repair a few days later    Other surgical history      hernia repair with mesh (a few years after colon resection surgeries)     Allergies   Allergen Reactions    Bee Venom SWELLING    Bees SWELLING    [place] OTHER (SEE COMMENTS)     Allergy testing    Shrimp OTHER (SEE COMMENTS)     Allergy Testing     Current Outpatient Medications   Medication Sig Dispense Refill    famotidine 40 MG Oral Tab Take 1 tablet (40 mg total) by mouth 2 (two) times daily.      lisdexamfetamine (VYVANSE) 50 MG Oral Cap Take 1 capsule (50 mg total) by mouth every morning. 30 capsule 0    buPROPion  MG Oral Tablet 24 Hr Take 1 tablet (150 mg total) by mouth daily. 90 tablet 0    Brimonidine Tartrate (LUMIFY) 0.025 % Ophthalmic Solution Apply 1 Application to eye As Directed.      TRULANCE 3 MG Oral Tab Take 3 mg by mouth daily.      EPINEPHrine 0.3 MG/0.3ML Injection Solution Auto-injector Inject 0.3 mL (1 each total) into the muscle as needed. 1 each 1    atorvastatin 10 MG Oral Tab TAKE 1 TABLET(10 MG) BY MOUTH EVERY NIGHT 90 tablet 2    ibuprofen (ADVIL) 200 MG Oral Tab Take 1 tablet (200 mg total) by mouth in the morning and 1 tablet (200 mg total) before bedtime.      fluticasone propionate 50 MCG/ACT Nasal Suspension 2 sprays by Each Nare route daily. 1 each 0    Polyethylene Glycol 3350 (MIRALAX OR) Take by mouth.      multivitamin Oral Tab Take 1 tablet by mouth daily.        Social History     Socioeconomic History    Marital status:    Occupational History     Employer: [employer]     Comment:    Tobacco Use    Smoking status: Former     Current packs/day: 0.00     Average packs/day: 0.3 packs/day for 1 year (0.3 ttl pk-yrs)     Types: Cigarettes     Start date: 1/1/2008     Quit date: 1/1/2009     Years since quitting: 15.5    Smokeless tobacco: Never    Tobacco comments:     non-smoker   Vaping Use    Vaping status: Never Used    Substance and Sexual Activity    Alcohol use: Not Currently     Alcohol/week: 5.0 - 7.0 standard drinks of alcohol     Types: 5 - 7 Glasses of wine per week    Drug use: No    Sexual activity: Yes     Partners: Female     Social Determinants of Health      [facility]    Housing Stability      Immunization History   Administered Date(s) Administered    Covid-19 Vaccine Moderna 100 mcg/0.5 ml 01/29/2021, 03/05/2021, 01/06/2022    FLULAVAL 6 months & older 0.5 ml Prefilled syringe (23523) 11/11/2018, 09/15/2020    Influenza 09/19/2014    TDAP 09/15/2020    Zoster Vaccine Recombinant Adjuvanted (Shingrix) 09/19/2023      Family History   Problem Relation Age of Onset    Other (Other) Mother         RA    Prostate Cancer Father     Other (Other) Sister         RA    Breast Cancer Maternal Grandmother 20        estimate    Breast Cancer Paternal Grandmother 60    Cancer Maternal Aunt         great aunt breast cancer    Breast Cancer Maternal Aunt 60    Cancer Paternal Aunt         lupectomy ? breast CA?    Breast Cancer Paternal Cousin Female 35    Breast Cancer Paternal Cousin Female 30    Breast Cancer Paternal Cousin Female 40        estimates        Review of Systems   Constitutional:  Positive for fatigue. Negative for fever and unexpected weight change.   HENT:  Positive for congestion (periodically). Negative for mouth sores, nosebleeds, postnasal drip, rhinorrhea, sore throat and trouble swallowing.    Eyes:  Negative for visual disturbance.   Respiratory:  Negative for apnea, cough, choking, chest tightness, shortness of breath and wheezing.    Cardiovascular:  Negative for chest pain, palpitations and leg swelling.   Gastrointestinal:  Positive for constipation. Negative for abdominal pain, diarrhea, nausea and vomiting.   Genitourinary:  Negative for difficulty urinating.   Musculoskeletal:  Positive for arthralgias and myalgias. Negative for  back pain and gait problem.   Neurological:  Positive for headaches. Negative for dizziness and weakness.   Psychiatric/Behavioral:  Positive for sleep disturbance.      Vitals:    07/17/24 0949   BP: 118/72   Pulse: 78   Resp: 18   Temp: 98.7 °F (37.1 °C)      SpO2: (!) 87 %  Ht Readings from Last 1 Encounters:   07/17/24 5' 6\" (1.676 m)     Wt Readings from Last 1 Encounters:   07/17/24 190 lb (86.2 kg)     Body mass index is 30.67 kg/m².     Physical Exam  Constitutional:       General: She is not in acute distress.     Appearance: Normal appearance. She is not ill-appearing or diaphoretic.   HENT:      Head: Normocephalic and atraumatic.      Nose: Nose normal. No congestion or rhinorrhea.      Comments: Narrow bilateral naris with hyperemia on the left nostril     Mouth/Throat:      Mouth: Mucous membranes are moist.      Pharynx: Oropharynx is clear. No oropharyngeal exudate or posterior oropharyngeal erythema.      Comments: Mallampati class IV palate  Eyes:      Extraocular Movements: Extraocular movements intact.      Pupils: Pupils are equal, round, and reactive to light.   Cardiovascular:      Rate and Rhythm: Normal rate.      Pulses: Normal pulses.      Heart sounds: Normal heart sounds. No murmur heard.  Pulmonary:      Effort: Pulmonary effort is normal. No respiratory distress.      Breath sounds: Normal breath sounds. No wheezing or rhonchi.   Chest:      Chest wall: No tenderness.   Abdominal:      General: Abdomen is flat. Bowel sounds are normal.      Palpations: Abdomen is soft.   Musculoskeletal:         General: Normal range of motion.   Skin:     General: Skin is warm.   Neurological:      General: No focal deficit present.      Mental Status: She is alert and oriented to person, place, and time.   Psychiatric:         Mood and Affect: Mood normal.         Behavior: Behavior normal.         Thought Content: Thought content normal.         Judgment: Judgment normal.             Labs:  Last  BMP  Lab Results   Component Value Date     (H) 09/19/2023    BUN 10 09/19/2023    CREATSERUM 0.89 09/19/2023    BUNCREA 10.2 09/15/2020    ANIONGAP 1 09/19/2023    GFRAA 84 06/16/2022    GFRNAA 73 06/16/2022    CA 9.1 09/19/2023     09/19/2023    K 4.5 09/19/2023     09/19/2023    CO2 30.0 09/19/2023    OSMOCALC 289 09/19/2023      Last CBC  Lab Results   Component Value Date    WBC 7.1 09/19/2023    RBC 4.56 09/19/2023    HGB 13.7 09/19/2023    HCT 42.7 09/19/2023    MCV 93.6 09/19/2023    MCH 30.0 09/19/2023    MCHC 32.1 09/19/2023    RDW 12.8 09/19/2023    .0 09/19/2023      Last CMP  Lab Results   Component Value Date     (H) 09/19/2023    BUN 10 09/19/2023    BUNCREA 10.2 09/15/2020    CREATSERUM 0.89 09/19/2023    ANIONGAP 1 09/19/2023    GFR 80 08/08/2017    GFRNAA 73 06/16/2022    GFRAA 84 06/16/2022    CA 9.1 09/19/2023    OSMOCALC 289 09/19/2023    ALKPHO 74 09/19/2023    AST 22 09/19/2023    ALT 27 09/19/2023    BILT 0.6 09/19/2023    TP 7.3 09/19/2023    ALB 4.1 09/19/2023    GLOBULIN 3.2 09/19/2023     09/19/2023    K 4.5 09/19/2023     09/19/2023    CO2 30.0 09/19/2023      Last Thyroid Function  Lab Results   Component Value Date    T4F 1.2 09/15/2020    TSH 2.820 09/19/2023        Imaging personally reviewed :  CT Chest 4/24/2024 :  PROCEDURE:  CT CHEST (CPT=71250)     COMPARISON:  [facility]OY, CT CHEST(CONTRAST ONLY) (CPT=71260), 10/20/2023, 3:03 PM.     INDICATIONS:  R05.3 Chronic cough     TECHNIQUE:  Unenhanced multislice CT scanning is performed through the chest.  Dose reduction techniques were used. Dose information is transmitted to the ACR (American College of Radiology) NRDR (National Radiology Data Registry) which includes the Dose   Index Registry.     PATIENT STATED HISTORY: (As transcribed by Technologist)  Surveillance pulmonary nodules and chronic cough.         FINDINGS:    LUNGS:  Stable.  There a few scattered noncalcified pulmonary  nodules.  These measure up to 0.57 cm.  Left upper lobe, perifissural measuring up to 0.57 cm.  Series 3, image 49. Left lower lobe pleural based measuring up to 0.6 cm.  Series 3, image 97.  Small calcified granuloma medial right lower lobe.  Series 3, image 102. No bronchiectasies, cystic lung change or new consolidation.    VASCULATURE:  Stable.  Smooth tapering.  KAY:  Stable.  No adenopathy.  MEDIASTINUM:  Stable.  No adenopathy.  CARDIAC:  Stable.  No pericardial effusion.  No significant coronary calcifications.  PLEURA:  Stable.  No pleural effusion.  THORACIC AORTA:  Stable.  Normal caliber.  CHEST WALL:  Stable.  No axillary adenopathy.  LIMITED ABDOMEN:  Stable.  No abnormality.  BONES:  Stable.  Moderate degenerative changes.  Normal vertebral body heights.  No subluxation.                      Impression   CONCLUSION:    1. A few scattered tiny and small noncalcified pulmonary nodules are stable.  These may be noncalcified granuloma as there is a calcified granuloma as well.  2. No new abnormality.  3. Continued clinical correlation recommended.  Please correlate with risk factors to guide follow-up.  If no risk factors are identified, current Fleischner guidelines do not recommend follow up based on size.       LOCATION:  [place]        Dictated by (CST): Fransico Strange MD on 4/29/2024 at 4:30 PM         CT chest Low dose   PROCEDURE:  CT CHEST(CONTRAST ONLY) (CPT=71260)     COMPARISON:  [facility] SOTNE, CT CALCIUM SCORING OVER READ, 10/02/2023, 5:42 PM.     INDICATIONS:  R91.8 Pulmonary nodules     TECHNIQUE:  CT images were obtained with non-ionic intravenous contrast material. Dose reduction techniques were used. Dose information is transmitted to the ACR (American College of Radiology) NRDR (National Radiology Data Registry) which includes the  Dose Index Registry.     PATIENT STATED HISTORY:(As transcribed by Technologist)  previous finding on CT calcium score.      CONTRAST USED:  75cc of Isovue  370     FINDINGS:       LUNGS/PLLEURA:  Noncalcified fissure based nodule 6 x 3 mm within the left major fissure image 40, stable.  A 7 x 4 mm nodule image 52 in the middle lobe.  This is stable.  Another middle lobe nodule 4 mm stable image 54.  A previously demonstrated left  lower lobe nodules currently not well seen.  No new or enlarging pulmonary nodule.  No sign of pneumonia, pleural effusion, pneumothorax.     THORACIC AORTA:  No aneurysm or other acute process.     MEDIASTINUM/KAY:  Unremarkable.     CARDIAC:  Unremarkable.     CHEST WALL:  Unremarkable.     LIMITED ABDOMEN:  No acute process.     BONES:  No acute process.     OTHER:    None.                      Impression   CONCLUSION:  Pulmonary nodules are seen, 3 of which were present on the prior exam and currently demonstrated, without change in size over the very short interval follow-up.  A 4th nodule in the left lung is currently not seen.  Longer-term follow-up can   be obtained, according to below guidelines if clinically indicated.  The findings described above include a newly detected solid pulmonary nodule of 6-8 mm average diameter.  Guidelines from the Fleischner Society for the follow-up and management of newly detected indeterminate pulmonary nodules in persons >34 years old  depend on nodule size (average of length and width) and underlying  risk factors (including smoking and other risk factors). Please consider the following recommendations after  clinical assessment of risk factors. For nodules measuring 6-8 mm in size:    In both low and high risk patients, advise CT scan of the chest in 6-12 months, then consider follow-up CT in 18-24 months.           LOCATION:  [facility]        Dictated by (CST): Cyrus Dutton MD on 10/20/2023 at 3:36 PM           PROCEDURE:  CT CALCIUM SCORING OVER READ     COMPARISON:  None.     INDICATIONS:  Z13.6 Screening for cardiovascular condition Z00.00 Annual physical exam     TECHNIQUE:  Unenhanced  multislice CT scanning is performed through the chest as part of a cardiac CT evaluation.  Dose reduction techniques were used. Dose information is transmitted to the ACR (American College of Radiology) NRDR (National Radiology  Data Registry) which includes the Dose Index Registry.     PATIENT STATED HISTORY: (As transcribed by Technologist)  No problem or complaints.         FINDINGS:    LUNGS:  Well-defined noncalcified fissure based 6 x 3 mm nodule within the left major interlobar fissure image 18. Noncalcified left lower lobe nodule image 61 4 mm.  7 x 4 mm nodule middle lobe image 51. 4 mm nodule middle lobe image 56. Additional tiny   scattered micro nodules.    KAY:  No mass or adenopathy.    MEDIASTINUM:  No mass or adenopathy.    PLEURA:  No mass or effusion.    CHEST WALL:  No mass or axillary adenopathy.    LIMITED ABDOMEN:  Limited images of the upper abdomen are unremarkable.    BONES:  No bony lesion or fracture.          This is an over read for the non-cardiac, non-vascular limited visualized portions of the chest and abdomen. This exam was not performed as a complete diagnostic CT of the chest. Please see the cardiologists' dictation which is reported separately.                   Impression   CONCLUSION:  The findings described above include newly detected solid pulmonary nodules of 6-8 mm average diameter.  Guidelines from the Fleischner Society for the follow-up and management of newly detected indeterminate pulmonary nodules in persons >34   years old depend on nodule size (average of length and width) and underlying  risk factors (including smoking and other risk factors). Please consider the following recommendations after  clinical assessment of risk factors. For nodules measuring 6-8 mm   in size:  In both low and high risk patients, advise CT scan of the chest in 6-12 months, then consider follow-up CT in 18-24 months.                 LOCATION:  [facility]        Dictated by (CST): Marija  MD Cyrus on 10/03/2023 at 8:34 AM         Sleep Study 11/15/2023 Type: Diagnostic   Procedure: The Polysomnogram was performed with a sleep technologist in attendance at the [facility]. The following parameters were monitored: central, occipital and temporal EEG, EOG, submentalis EMG, nasal flow, nasal pressure, respiratory inductance plethysmography and oxygen saturation via continuous pulse oximetry. Sleep stages, periodic limb movements and EEG arousals were scored in accordance with the American Academy of Sleep Medicine Manual for the Scoring of Sleep and Associated Events. Apnea Hypopnea Index was calculated using the recommended definition of hypopnea for scoring respiratory events. Data acquisition, collection and scoring have been validated and clinically correlated.   Sleep History: STEVE HONG is a 58 year old Female referred by Dr. Tadeo Crews for the evaluation of suspected sleep disordered breathing.   Past Medical History is pertinent for Hypersomnia, Fatigue.   At the time of the study, the patient was prescribed the following medications: .   The patient was studied from 09:35:56 PM to 05:00:03 AM, with a total recording time of 444.1, total sleep time of 293.5 and a sleep efficiency of 66.1%. Wake after sleep onset was 79.0 minutes. The percentage of total sleep time by sleep stage is as follows: Stage 1 4.4%, Stage 2 37.3%, Stage 3 37.8% and Stage REM 20.4%.   Respiratory Analysis: The Apnea-Hypopnea Index (AHI) was 6.5 events per hour. REM related AHI was 21.0 events per hour. Supine related AHI was 6.5. Lateral related AHI was 0. The Central Apnea Index was 0. The oxygen saturation magi was 84.0% and patient spent 0.1% with saturations less than 88%.   Snoring Profile: Snoring was mild.   Cardiac Profile: Electrocardiogram demonstrated normal sinus rhythm.   EEG Profile: Based on the limited recording montage, there were no significant EEG abnormalities noted. There were 48  arousals, with a total arousal index of 9.8.   Periodic Limb Movements: PLM index of 0. PLM arousal index of 0.   IMPRESSIONS: This study demonstrates mild obstructive sleep apnea.   Diagnosis: Obstructive Sleep Apnea G47.33   RECOMMENDATIONS:   1. It is recommended to proceed with CPAP titration.   2. The patient should avoid alcohol and sedative medications, as these may worsen severity of symptoms.   3. The patient should avoid drowsy driving.   4. Patient to follow up with with a sleep specialist for a consultation .     Thank you for allowing me to participate in this patient's care. Please do not hesitate to contact me with any additional questions.   Dictated by: Dr. Crews   Electronically signed by Tadeo Crews MD   (Electronic Signature)   Board Certified in Sleep Medicine             Usage 04/18/2024 - 07/16/2024  Usage days 75/90 days (83%)  >= 4 hours 70 days (78%)  < 4 hours 5 days (6%)  Usage hours 472 hours 27 minutes  Average usage (total days) 5 hours 15 minutes  Average usage (days used) 6 hours 18 minutes  Median usage (days used) 6 hours 44 minutes  Total used hours (value since last reset - 07/16/2024) 1,155 hours  AirSense 10 AutoSet  Serial number [id number]  Mode AutoSet  Min Pressure 5 cmH2O  Max Pressure 15 cmH2O  EPR Fulltime  EPR level 3  Response Standard  Therapy  Pressure - cmH2O Median: 6.3 95th percentile: 8.3 Maximum: 9.5  Leaks - L/min Median: 0.2 95th percentile: 10.1 Maximum: 20.2  Events per hour AI: 1.7 HI: 0.0 AHI: 1.7  Apnea Index Central: 0.4 Obstructive: 1.2 Unknown: 0.0  RERA Index 0.0  Cheyne-Munoz respiration (average duration per night) 0 minutes (0%)  DME LEHAN'S  MASK AIRFIT N30 small    Pinckard Sleepiness Scale: (ESS) score on today's visit is 12  out of 24.     Score total of 1-6    Normal sleep   Score total of 7-8    Average sleepiness   Score total of 9-24    Abnormal (possibly pathologic) sleepiness       Impression:    Obstructive sleep apnea syndrome  (OSAS):: Polysomnogram performed on 11/15/2023 showed Apnea-Hypopnea Index (AHI) was 6.5 events per hour. REM related AHI was 21.0 events per hour. Supine related AHI was 6.5. Lateral related AHI was 0. The Central Apnea Index was 0. The oxygen saturation magi was 84.0% and patient spent 0.1% with saturations less than 88%. Treated with auto CPAP at 5-15 cwp. Download data on 7/26/2024   for 90  days shows adequate AHI and compliance   Daytime hypersomnolence/fatigue  Obesity: Class I;  Body mass index is 30.67 kg/m².  Pulmonary nodules on CT calcium heart scan on limited portion of lung: The patient has history of secondhand smoking from her heavy smoking , with history of breast cancer in maternal and paternal grandmothers, she appears at risk for lung cancer: CT heart scan on 10/2/2023 shows well-defined noncalcified fissure based 6 x 3 mm nodule within the left major interlobar fissure. Noncalcified left lower lobe nodule 4 mm.  7 x 4 mm nodule middle lobe.  4 mm nodule middle lobe image 56. Additional tiny scattered micro nodules. Pulmonary nodules: CT chest 10/20/2023 showed Noncalcified fissure based nodule 6 x 3 mm within the left major fissure image 40, stable.  A 7 x 4 mm nodule image 52 in the middle lobe.  This is stable.  Another middle lobe nodule 4 mm stable image 54.  CT Chest: 4/24/2024 ew scattered noncalcified pulmonary nodules.  These measure up to 0.57 cm.  Left upper lobe, perifissural measuring up to 0.57 cm.  Series 3, image 49. Left lower lobe pleural based measuring up to 0.6 cm.  Series 3, image 97.   Bruxism: The patient needs dental guard  GERD  History of migraine headaches  ADHD on Vyvance stimulant   Sjogren's syndrome   SpO2 87%% due to cold hands improved to 98% upon warming hands upon re-check at end of visit                      Plan:    Continue  auto-titrating CPAP at 5-15 cwp at bedtime as the patient is using and benefiting from it   Advised about weight loss   Advised  patient to sleep 7-8 hours to achieve full rest and be more awake during the daytime   GERD precautions   Advised against drowsy driving and to avoid alcoholic beverage and respiratory depressants as these may worsen sleep apnea  Follow up CT chest to evaluate pulmonary nodules  around 1/2025      Follow up: 8 months     Thank you for allowing me to participate in your patient care.    LIGIA Crews MD, FACP, FCCP, FAASM - Pulmonary/Critical care/Sleep Medicine  Please contact our office if you have any questions or concerns at [phone]

## 2024-08-01 ENCOUNTER — TELEPHONE (OUTPATIENT)
Dept: FAMILY MEDICINE CLINIC | Facility: CLINIC | Age: 59
End: 2024-08-01

## 2024-08-01 DIAGNOSIS — E78.2 MIXED HYPERLIPIDEMIA: ICD-10-CM

## 2024-08-01 DIAGNOSIS — R93.1 ELEVATED CORONARY ARTERY CALCIUM SCORE: ICD-10-CM

## 2024-08-01 RX ORDER — PLECANATIDE 3 MG/1
3 TABLET ORAL DAILY
Qty: 30 TABLET | Refills: 0 | Status: SHIPPED | OUTPATIENT
Start: 2024-08-01

## 2024-08-01 RX ORDER — ATORVASTATIN CALCIUM 10 MG/1
10 TABLET, FILM COATED ORAL NIGHTLY
Qty: 90 TABLET | Refills: 2 | OUTPATIENT
Start: 2024-08-01

## 2024-08-01 NOTE — TELEPHONE ENCOUNTER
Cholesterol Medication Protocol Cgonpl5608/01/2024 07:39 AM   Protocol Details ALT < 80    ALT resulted within past year    Lipid panel within past 12 months    In person appointment or virtual visit in the past 12 mos or appointment in next 3 mos      Refilled per protocol  atorvastatin 10 MG Oral Tab   Last refilled on 4/17/24 #90 with 2 rf.  LOV- 5/6/24  Last labs- 12/22/23    Sent to pharmacy

## 2024-08-01 NOTE — TELEPHONE ENCOUNTER
Routing to provider per protocol.   TRULANCE 3 MG Oral Tab   Last refilled on 5/15/24 for #  with  rf.   Last labs 12/22/23.   Last seen on 5/6/24.       Future Appointments   Date Time Provider Department Center   8/27/2024  5:00 PM Minnie Banks APRN [id number]   9/23/2024  4:20 PM Rosalva Yu APRN [facility]   3/24/2025  9:00 AM Tadeo Crews MD EE [facility]          Thank you.

## 2024-09-23 ENCOUNTER — OFFICE VISIT (OUTPATIENT)
Dept: FAMILY MEDICINE CLINIC | Facility: CLINIC | Age: 59
End: 2024-09-23
Payer: COMMERCIAL

## 2024-09-23 VITALS
RESPIRATION RATE: 16 BRPM | HEART RATE: 77 BPM | BODY MASS INDEX: 30.22 KG/M2 | OXYGEN SATURATION: 97 % | TEMPERATURE: 98 F | SYSTOLIC BLOOD PRESSURE: 122 MMHG | HEIGHT: 66 IN | DIASTOLIC BLOOD PRESSURE: 78 MMHG | WEIGHT: 188 LBS

## 2024-09-23 DIAGNOSIS — R05.3 CHRONIC COUGH: ICD-10-CM

## 2024-09-23 DIAGNOSIS — E55.9 VITAMIN D INSUFFICIENCY: ICD-10-CM

## 2024-09-23 DIAGNOSIS — G47.33 OSA (OBSTRUCTIVE SLEEP APNEA): ICD-10-CM

## 2024-09-23 DIAGNOSIS — Z86.19 HISTORY OF SHINGLES: ICD-10-CM

## 2024-09-23 DIAGNOSIS — Z82.61 FAMILY HISTORY OF RHEUMATOID ARTHRITIS: ICD-10-CM

## 2024-09-23 DIAGNOSIS — R53.82 CHRONIC FATIGUE: ICD-10-CM

## 2024-09-23 DIAGNOSIS — R91.8 PULMONARY NODULES: ICD-10-CM

## 2024-09-23 DIAGNOSIS — F90.2 ADHD (ATTENTION DEFICIT HYPERACTIVITY DISORDER), COMBINED TYPE: ICD-10-CM

## 2024-09-23 DIAGNOSIS — Z87.19 HISTORY OF HERNIA REPAIR: ICD-10-CM

## 2024-09-23 DIAGNOSIS — K21.9 GASTROESOPHAGEAL REFLUX DISEASE WITHOUT ESOPHAGITIS: ICD-10-CM

## 2024-09-23 DIAGNOSIS — M35.00 SJOGREN'S SYNDROME WITHOUT EXTRAGLANDULAR INVOLVEMENT (HCC): ICD-10-CM

## 2024-09-23 DIAGNOSIS — Z98.890 HISTORY OF HERNIA REPAIR: ICD-10-CM

## 2024-09-23 DIAGNOSIS — G25.81 RLS (RESTLESS LEGS SYNDROME): ICD-10-CM

## 2024-09-23 DIAGNOSIS — G47.19 DAYTIME HYPERSOMNOLENCE: ICD-10-CM

## 2024-09-23 DIAGNOSIS — Z00.00 ANNUAL PHYSICAL EXAM: Primary | ICD-10-CM

## 2024-09-23 DIAGNOSIS — R10.31 RLQ ABDOMINAL PAIN: ICD-10-CM

## 2024-09-23 DIAGNOSIS — Z12.4 SCREENING FOR CERVICAL CANCER: ICD-10-CM

## 2024-09-23 DIAGNOSIS — F41.1 GENERALIZED ANXIETY DISORDER: ICD-10-CM

## 2024-09-23 DIAGNOSIS — K59.09 CHRONIC CONSTIPATION: ICD-10-CM

## 2024-09-23 LAB
ALBUMIN SERPL-MCNC: 4.3 G/DL (ref 3.2–4.8)
ALBUMIN/GLOB SERPL: 1.5 {RATIO} (ref 1–2)
ALP LIVER SERPL-CCNC: 80 U/L
ALT SERPL-CCNC: 16 U/L
ANION GAP SERPL CALC-SCNC: 4 MMOL/L (ref 0–18)
AST SERPL-CCNC: 22 U/L (ref ?–34)
BASOPHILS # BLD AUTO: 0.03 X10(3) UL (ref 0–0.2)
BASOPHILS NFR BLD AUTO: 0.5 %
BILIRUB SERPL-MCNC: 0.8 MG/DL (ref 0.3–1.2)
BUN BLD-MCNC: 12 MG/DL (ref 9–23)
CALCIUM BLD-MCNC: 11.1 MG/DL (ref 8.7–10.4)
CHLORIDE SERPL-SCNC: 107 MMOL/L (ref 98–112)
CHOLEST SERPL-MCNC: 166 MG/DL (ref ?–200)
CO2 SERPL-SCNC: 30 MMOL/L (ref 21–32)
CREAT BLD-MCNC: 0.97 MG/DL
DEPRECATED HBV CORE AB SER IA-ACNC: 85 NG/ML
EGFRCR SERPLBLD CKD-EPI 2021: 68 ML/MIN/1.73M2 (ref 60–?)
EOSINOPHIL # BLD AUTO: 0.11 X10(3) UL (ref 0–0.7)
EOSINOPHIL NFR BLD AUTO: 1.7 %
ERYTHROCYTE [DISTWIDTH] IN BLOOD BY AUTOMATED COUNT: 12.3 %
FASTING PATIENT LIPID ANSWER: NO
FASTING STATUS PATIENT QL REPORTED: NO
GLOBULIN PLAS-MCNC: 2.9 G/DL (ref 2–3.5)
GLUCOSE BLD-MCNC: 90 MG/DL (ref 70–99)
HCT VFR BLD AUTO: 40.8 %
HDLC SERPL-MCNC: 48 MG/DL (ref 40–59)
HGB BLD-MCNC: 13.3 G/DL
IMM GRANULOCYTES # BLD AUTO: 0.01 X10(3) UL (ref 0–1)
IMM GRANULOCYTES NFR BLD: 0.2 %
IRON SATN MFR SERPL: 15 %
IRON SERPL-MCNC: 46 UG/DL
LDLC SERPL CALC-MCNC: 105 MG/DL (ref ?–100)
LIPASE SERPL-CCNC: 30 U/L (ref 12–53)
LYMPHOCYTES # BLD AUTO: 1.98 X10(3) UL (ref 1–4)
LYMPHOCYTES NFR BLD AUTO: 31.1 %
MCH RBC QN AUTO: 30.4 PG (ref 26–34)
MCHC RBC AUTO-ENTMCNC: 32.6 G/DL (ref 31–37)
MCV RBC AUTO: 93.2 FL
MONOCYTES # BLD AUTO: 0.5 X10(3) UL (ref 0.1–1)
MONOCYTES NFR BLD AUTO: 7.8 %
NEUTROPHILS # BLD AUTO: 3.74 X10 (3) UL (ref 1.5–7.7)
NEUTROPHILS # BLD AUTO: 3.74 X10(3) UL (ref 1.5–7.7)
NEUTROPHILS NFR BLD AUTO: 58.7 %
NONHDLC SERPL-MCNC: 118 MG/DL (ref ?–130)
OSMOLALITY SERPL CALC.SUM OF ELEC: 291 MOSM/KG (ref 275–295)
PLATELET # BLD AUTO: 307 10(3)UL (ref 150–450)
POTASSIUM SERPL-SCNC: 4.6 MMOL/L (ref 3.5–5.1)
PROT SERPL-MCNC: 7.2 G/DL (ref 5.7–8.2)
RBC # BLD AUTO: 4.38 X10(6)UL
SODIUM SERPL-SCNC: 141 MMOL/L (ref 136–145)
T4 FREE SERPL-MCNC: 1.4 NG/DL (ref 0.8–1.7)
TOTAL IRON BINDING CAPACITY: 317 UG/DL (ref 250–425)
TRANSFERRIN SERPL-MCNC: 244 MG/DL (ref 250–380)
TRIGL SERPL-MCNC: 68 MG/DL (ref 30–149)
TSI SER-ACNC: 2.53 MIU/ML (ref 0.55–4.78)
VIT D+METAB SERPL-MCNC: 48.8 NG/ML (ref 30–100)
VLDLC SERPL CALC-MCNC: 11 MG/DL (ref 0–30)
WBC # BLD AUTO: 6.4 X10(3) UL (ref 4–11)

## 2024-09-23 PROCEDURE — 3078F DIAST BP <80 MM HG: CPT | Performed by: NURSE PRACTITIONER

## 2024-09-23 PROCEDURE — 83735 ASSAY OF MAGNESIUM: CPT | Performed by: NURSE PRACTITIONER

## 2024-09-23 PROCEDURE — 80061 LIPID PANEL: CPT | Performed by: NURSE PRACTITIONER

## 2024-09-23 PROCEDURE — 3074F SYST BP LT 130 MM HG: CPT | Performed by: NURSE PRACTITIONER

## 2024-09-23 PROCEDURE — 82306 VITAMIN D 25 HYDROXY: CPT | Performed by: NURSE PRACTITIONER

## 2024-09-23 PROCEDURE — 87624 HPV HI-RISK TYP POOLED RSLT: CPT | Performed by: NURSE PRACTITIONER

## 2024-09-23 PROCEDURE — 83540 ASSAY OF IRON: CPT | Performed by: NURSE PRACTITIONER

## 2024-09-23 PROCEDURE — 83690 ASSAY OF LIPASE: CPT | Performed by: NURSE PRACTITIONER

## 2024-09-23 PROCEDURE — 80050 GENERAL HEALTH PANEL: CPT | Performed by: NURSE PRACTITIONER

## 2024-09-23 PROCEDURE — 84439 ASSAY OF FREE THYROXINE: CPT | Performed by: NURSE PRACTITIONER

## 2024-09-23 PROCEDURE — 83550 IRON BINDING TEST: CPT | Performed by: NURSE PRACTITIONER

## 2024-09-23 PROCEDURE — 3008F BODY MASS INDEX DOCD: CPT | Performed by: NURSE PRACTITIONER

## 2024-09-23 PROCEDURE — 82728 ASSAY OF FERRITIN: CPT | Performed by: NURSE PRACTITIONER

## 2024-09-23 PROCEDURE — 99396 PREV VISIT EST AGE 40-64: CPT | Performed by: NURSE PRACTITIONER

## 2024-09-23 NOTE — PROGRESS NOTES
CHIEF COMPLAINT:    Chief Complaint   Patient presents with    Physical     And Pap       HISTORY OF PRESENT ILLNESS:    Simi Beasley is a 58 year old female who has a history of sjogrens, low vitamin d, restless leg syndrome, ADHD, anxiety, constipation, GERD, and YOLI presents today, September 23, 2024, for an annual physical and one new acute problem of right lower abdominal pain.    - NUTRITION:  Follows a regular diet.  Drinks approximately 48-64oz of water a day.   - SLEEP:  Sleeps throughout the night.  Wakes up feeling rested more often than not.  Using CPAP nightly.   - SAFETY:  Reports feeling safe at home.     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   - GOAL:  Simi would like to improve health overall by completing screening/preventive care testing.    IMMUNIZATIONS:  Declines today    Patient Active Problem List   Diagnosis    Family history of rheumatoid arthritis    Chronic constipation    History of hernia repair    RLS (restless legs syndrome)    Sjogren's syndrome without extraglandular involvement (HCC)    History of shingles    Chronic fatigue    YOLI (obstructive sleep apnea)    Gastroesophageal reflux disease without esophagitis    Pulmonary nodules    Chronic cough    Vitamin D insufficiency    ADHD (attention deficit hyperactivity disorder), combined type    Anxiety disorder    Daytime hypersomnolence        ALLERGIES:  Allergies   Allergen Reactions    Bee Venom SWELLING    Bees SWELLING    [place] OTHER (SEE COMMENTS)     Allergy testing    Shrimp OTHER (SEE COMMENTS)     Allergy Testing       CURRENT MEDICATIONS:  Current Outpatient Medications   Medication Sig Dispense Refill    amphetamine-dextroamphetamine (ADDERALL) 10 MG Oral Tab 1 tab at noon 30 tablet 0    lisdexamfetamine (VYVANSE) 50 MG Oral Cap Take 1 capsule (50 mg total) by mouth every morning. 30 capsule 0    TRULANCE 3 MG Oral Tab Take 3 mg by mouth daily. 30 tablet 0    famotidine 40 MG Oral Tab  We don't want to go backwards at this point   Take 1 tablet (40 mg total) by mouth 2 (two) times daily.      buPROPion  MG Oral Tablet 24 Hr Take 1 tablet (150 mg total) by mouth daily. 90 tablet 0    Brimonidine Tartrate (LUMIFY) 0.025 % Ophthalmic Solution Apply 1 Application to eye As Directed.      EPINEPHrine 0.3 MG/0.3ML Injection Solution Auto-injector Inject 0.3 mL (1 each total) into the muscle as needed. 1 each 1    atorvastatin 10 MG Oral Tab TAKE 1 TABLET(10 MG) BY MOUTH EVERY NIGHT 90 tablet 2    ibuprofen (ADVIL) 200 MG Oral Tab Take 1 tablet (200 mg total) by mouth in the morning and 1 tablet (200 mg total) before bedtime.      fluticasone propionate 50 MCG/ACT Nasal Suspension 2 sprays by Each Nare route daily. 1 each 0    multivitamin Oral Tab Take 1 tablet by mouth daily.         MEDICAL HISTORY:  Past Medical History:    ALCOHOL USE    Allergic rhinitis    Anxiety    GERD (gastroesophageal reflux disease)    Hyperlipidemia    Migraines    Obesity    Pneumonia due to organism    Sleep apnea    Thyroid disease     Past Surgical History:   Procedure Laterality Date    Cataract      Colectomy      Colonoscopy N/A 10/09/2017    Procedure: COLONOSCOPY, POSSIBLE BIOPSY, POSSIBLE POLYPECTOMY 53467;  Surgeon: Cyrus Kunz MD;  Location: [facility]    Colonoscopy N/A 10/11/2017    Procedure: COLONOSCOPY, POSSIBLE BIOPSY, POSSIBLE POLYPECTOMY 06344;  Surgeon: Cyrus Kunz MD;  Location: [facility]    Colonoscopy,diagnostic N/A 2015    Procedure: COLONOSCOPY, POSSIBLE BIOPSY, POSSIBLE POLYPECTOMY 05893;  Surgeon: Cyrus Kunz MD;  [facility]    Hemorrhoidectomy      Hernia surgery            Other surgical history      colonoscopy    Other surgical history   ()    colon resection--\"intestine was nicked\" during initial surgery, had to go back in to repair a few days later    Other surgical history      hernia repair with mesh (a few years after colon resection surgeries)     Family History    Problem Relation Age of Onset    Other (Other) Mother         RA    Prostate Cancer Father     Other (Other) Sister         RA    Breast Cancer Maternal Grandmother 20        estimate    Breast Cancer Paternal Grandmother 60    Cancer Maternal Aunt         great aunt breast cancer    Breast Cancer Maternal Aunt 60    Cancer Paternal Aunt         lupectomy ? breast CA?    Breast Cancer Paternal Cousin Female 35    Breast Cancer Paternal Cousin Female 30    Breast Cancer Paternal Cousin Female 40        estimates     Family Status   Relation Status    Mo Alive    Fa     Sis Alive    MGMA     PGMA     Lindsay Alive    Son Alive    Mat Aunt     Pat Aunt (Not Specified)    Pat Cous Fem Alive    Pat Cous Fem Alive    Pat Cous Fem Alive     Social History     Socioeconomic History    Marital status:    Occupational History     Employer: "[employer]     Comment:    Tobacco Use    Smoking status: Former     Current packs/day: 0.00     Average packs/day: 0.3 packs/day for 1 year (0.3 ttl pk-yrs)     Types: Cigarettes     Start date: 2008     Quit date: 2009     Years since quitting: 15.7    Smokeless tobacco: Never    Tobacco comments:     non-smoker   Vaping Use    Vaping status: Never Used   Substance and Sexual Activity    Alcohol use: Not Currently     Alcohol/week: 5.0 - 7.0 standard drinks of alcohol     Types: 5 - 7 Glasses of wine per week    Drug use: No     Social Determinants of Health      [facility]    Housing Stability       ROS:    GENERAL:  Denies fever or chills  RESPIRATORY:  Denies difficulty breathing  CARDIAC:  Denies chest pain with exertion  GI:  Denies blood in stool  :  Denies blood in urine or painful urination  REPRO:  +RLQ pain.  Denies vaginal discharge   NEURO:  Denies recent falls   MSKL:  Denies joint stiffness or pain  SKIN:  Denies rashes  PSYCH:  Denies thoughts of self harm or harming others     VITALS:    /78   Pulse 77   Temp 97.6 °F (36.4 °C) (Temporal)   Resp 16   Ht 5' 6\" (1.676 m)   Wt 188 lb (85.3 kg)   SpO2 97%   BMI 30.34 kg/m²     Ideal body weight: 59.3 kg (130 lb 11.7 oz)  Adjusted ideal body weight: 69.7 kg (153 lb 10.2 oz)  Wt Readings from Last 3 Encounters:   09/23/24 188 lb (85.3 kg)   07/17/24 190 lb (86.2 kg)   05/06/24 197 lb (89.4 kg)     BP Readings from Last 3 Encounters:   09/23/24 122/78   07/17/24 118/72   05/06/24 138/80     Reviewed by Rosalva Yu MS, APRN, FNP-BC    PHYSICAL EXAM:    Constitutional:       Appearance: Normal appearance.  Sitting upright on exam table.  Well developed, well nourished, and in no acute distress.  HENT:      Head: Facial features symmetric. Normocephalic and atraumatic.      Right Ear: Canal clear without erythema or drainage.  TM clear and intact, neutral in position.      Left Ear: Canal clear without erythema or drainage.  TM clear and intact, neutral in position.      Nose: Nose normal.      Mouth/Throat: Mucous membranes are dry.  Uvula rises midline.  Eyes:      Extraocular Movements: Extraocular movements intact.      Conjunctiva/sclera: Conjunctivae normal. Sclera anicteric         Pupils: Pupils are equal, round, and reactive to light.   Neck:     Neck is supple. Trachea is midline.  No masses.      No obvious lympadenopathy with palpation of submandibular, pre/posterior auricular, anterior/posterior cervical, occipital, and supraclavicular nodes.  Cardiovascular:      Heart sounds: Regular rate and rhythm without murmur.     BLE without edema.  Pulmonary:      Effort: Pulmonary effort is normal.      Breath sounds: Lungs clear throughout.     No cough or wheezing.  Abdominal:      General: Abdomen is nondistended, bowel sounds normoactive, soft, nontender.  No organomegaly.  Musculoskeletal:         General: Normal range of motion. Strength of extremities are equal  bilaterally.     Range of motion without limitations.  Skin:     General: Skin is warm and dry.      Coloration: Skin is without jaundice     Findings: No bruising or rashes  Neurological:      General: No focal deficit present. Speech is clear and organized.     Mental Status: Alert and oriented to person, place, and time.      Sensory: Sensation is intact.      Motor: Motor function is intact. Movements are smooth and controlled without ataxia.     Coordination: Coordination is intact. Coordination normal.      Gait: Gait steady and nonantalgic.      Deep Tendon Reflexes: Reflexes 2+ bilaterally.  Psychiatric:         Mood and Affect: Mood normal.         Behavior: Behavior normal.         Thought Content: Thought content normal.         Judgment: Judgment normal.     GYN: External genitalia without masses, lesions, erythema, or tenderness.  Mucosa is pink in color, moist, and without signs of inflammation or irritation. Discharge is thin and white, without clumping/adhering to vaginal walls. Cervix/os pink without lesions or discoloration.  No motion tenderness. No pelvic or abdominal fullness/tenderness.   Pap test sample collected using brush.  Scant amount of bleeding.  Clinical chaperone present during exam.    ASSESSMENT & PLAN:  Plan on follow-up in 1 year or earlier if needed  Refer to result notes for further information    1. Annual physical exam  +RLQ pain  - Comp Metabolic Panel (14); Future  - CBC With Differential With Platelet; Future  - Lipid Panel; Future  - VENIPUNCTURE  - ThinPrep PAP Smear; Future  - Hpv Dna  High Risk , Thin Prep Collect; Future  - Ferritin; Future  - Iron And Tibc; Future  - Vitamin D [E]; Future  - Lipase [E]; Future  - TSH and Free T4 [E]; Future  - Comp Metabolic Panel (14)  - CBC With Differential With Platelet  - Lipid Panel  - Ferritin  - Iron And Tibc  - Vitamin D [E]  - Lipase [E]  - TSH and Free T4 [E]  - ThinPrep PAP Smear  - Hpv Dna  High Risk , Thin Prep  Collect  - Magnesium [E]; Future  - Magnesium [E]  - Image-Guided Pap Smear ([facility])    2. Screening for cervical cancer  Per patient's request  +RLQ pain  - ThinPrep PAP Smear; Future  - Hpv Dna  High Risk , Thin Prep Collect; Future  - ThinPrep PAP Smear  - Hpv Dna  High Risk , Thin Prep Collect  - Image-Guided Pap Smear ([facility])    3. Sjogren's syndrome without extraglandular involvement (HCC)  08/08/2017:  GERONIMO Screen  Negative Positive   08/08/2017:  [facility] [employer]  <100 AU/mL 241   Tongue and mouth, sticky, dry upon physical exam  Using eye drops frequently to help with moisturizing eyes, lumify provides some relief  Stable, continue following with Alfredo and advising comprehensive exam with [facility]  - Comp Metabolic Panel (14); Future    4. Chronic cough  Stable  - CBC With Differential With Platelet; Future  - CBC With Differential With Platelet    5. Pulmonary nodules  Stable  Chest CT completed 04/29/2024  Recommending follow-up in 1 year  - CBC With Differential With Platelet; Future  - CBC With Differential With Platelet    6. Vitamin D insufficiency  Stable  Continue supplement  - Vitamin D [E]; Future  - Vitamin D [E]    7. RLS (restless legs syndrome)  Controlled  - Comp Metabolic Panel (14); Future  - CBC With Differential With Platelet; Future  - TSH and Free T4 [E]  - Magnesium [E]; Future  - Magnesium [E]    8. ADHD (attention deficit hyperactivity disorder), combined type  Improving with vyvanse and adderall   Follows with [facility] every 2-3 months  Sees Kathryn Banks    9. Generalized anxiety disorder  Improving  Follows with [facility] every 2-3 months  Sees Kathryn Banks  - Comp Metabolic Panel (14); Future  - Magnesium [E]; Future  - Magnesium [E]    10. Chronic constipation  Stable  Reports improvement with Trulance  Bowel movement occurring every other day, loose stool  Following with Dr. Oconnell  - Comp Metabolic Panel (14); Future  - TSH and Free T4 [E]    11. Gastroesophageal reflux  disease without esophagitis  Stable, following with Dr. Oconnell  - Comp Metabolic Panel (14); Future  - Lipase [E]; Future  - Lipase [E]  - Magnesium [E]; Future  - Magnesium [E]    12. History of hernia repair  Asymptomatic    13. History of shingles  Declines vaccine today  Last episode of shingles at age of 19yo    14. Family history of rheumatoid arthritis  Negative rheumatoid factor 09/15/2020 and 03/29/2023    15. Daytime hypersomnolence  Minimal improvement  - Comp Metabolic Panel (14); Future  - CBC With Differential With Platelet; Future    16. YOLI (obstructive sleep apnea)  Stable  CPAP nightly  - CBC With Differential With Platelet; Future    17. Chronic fatigue  Minimal improvement  - Comp Metabolic Panel (14); Future  - CBC With Differential With Platelet; Future  - Vitamin D [E]; Future  - Vitamin D [E]  - TSH and Free T4 [E]  - Magnesium [E]; Future  - Magnesium [E]    18. RLQ abdominal pain  New concern  Patient believed to be related to constipation, colonoscopy with polyps, pain persists  Simi agreeable to complete US to assess for cysts/cause of pain  - Comp Metabolic Panel (14); Future  - US PELVIS (TRANSABDOMINAL AND TRANSVAGINAL) (CPT=76856/28369); Future

## 2024-09-24 LAB
HPV E6+E7 MRNA CVX QL NAA+PROBE: NEGATIVE
MAGNESIUM SERPL-MCNC: 2.2 MG/DL (ref 1.6–2.6)

## 2024-09-24 RX ORDER — PLECANATIDE 3 MG/1
1 TABLET ORAL DAILY
Qty: 30 TABLET | Refills: 0 | Status: SHIPPED | OUTPATIENT
Start: 2024-09-24

## 2024-09-24 NOTE — TELEPHONE ENCOUNTER
Routing to provider per protocol.   TRULANCE 3 MG Oral Tab   Last refilled on 8/1/24 for #30  with 0 rf.   Last labs 9/23/24.   Last seen on 9/23/24.       Future Appointments   Date Time Provider Department Center   9/24/2024  6:00 PM Minnie Banks APRN [facility] [id number]   10/26/2024  9:00 AM Suni Ruano MD [facility]   3/24/2025  9:00 AM Tadeo Crews MD [facility] [place]          Thank you.

## 2024-09-27 DIAGNOSIS — R93.1 ELEVATED CORONARY ARTERY CALCIUM SCORE: ICD-10-CM

## 2024-09-27 DIAGNOSIS — E78.2 MIXED HYPERLIPIDEMIA: ICD-10-CM

## 2024-09-27 RX ORDER — ATORVASTATIN CALCIUM 10 MG/1
10 TABLET, FILM COATED ORAL NIGHTLY
Qty: 90 TABLET | Refills: 2 | Status: SHIPPED | OUTPATIENT
Start: 2024-09-27

## 2024-09-27 NOTE — TELEPHONE ENCOUNTER
Patient comment: I am out of this but if doctor no longer thinks I need this ok     Cholesterol Medication Protocol [id number]/27/2024 05:27 AM   Protocol Details ALT < 80    ALT resulted within past year    Lipid panel within past 12 months    In person appointment or virtual visit in the past 12 mos or appointment in next 3 mos      Refilled per protocol  atorvastatin 10 MG Oral Tab   Last refilled on 4/17/24 #90 with 2 rf.  LOV- 9/23/24  Wellness-9/23/24  Last labs- 9/23/24    Sent to provider.

## 2024-09-30 LAB
.: NORMAL
.: NORMAL

## 2024-10-23 DIAGNOSIS — K59.09 CHRONIC CONSTIPATION: Primary | ICD-10-CM

## 2024-10-24 NOTE — TELEPHONE ENCOUNTER
TRULANCE 3MG TABLETS     No refill protocol for this medication.    Last refill: 9/24/2024, for #30, 0 refill  Last Visit: 9/23/2024  Last Px: 9/23/2024  Next Visit: N/A    Forward to Rosalva LIN, please advise on refills. Thanks.

## 2024-10-25 RX ORDER — PLECANATIDE 3 MG/1
1 TABLET ORAL DAILY
Qty: 30 TABLET | Refills: 0 | Status: SHIPPED | OUTPATIENT
Start: 2024-10-25

## 2024-10-30 ENCOUNTER — HOSPITAL ENCOUNTER (OUTPATIENT)
Dept: ULTRASOUND IMAGING | Age: 59
Discharge: HOME | End: 2024-10-30
Attending: NURSE PRACTITIONER
Payer: COMMERCIAL

## 2024-10-30 DIAGNOSIS — R10.31 RLQ ABDOMINAL PAIN: ICD-10-CM

## 2024-10-30 PROCEDURE — 76856 US EXAM PELVIC COMPLETE: CPT | Performed by: NURSE PRACTITIONER

## 2024-10-30 PROCEDURE — 76830 TRANSVAGINAL US NON-OB: CPT | Performed by: NURSE PRACTITIONER

## 2024-12-01 DIAGNOSIS — K59.09 CHRONIC CONSTIPATION: ICD-10-CM

## 2024-12-02 NOTE — TELEPHONE ENCOUNTER
No refill protocol for this medication.    Last refill: 10/25/2024 #30 with 0 refills  Last Visit: 9/23/2024  Next Visit:   Future Appointments   Date Time Provider Department Center   12/27/2024  8:00 AM Minnie Banks APRN [facility] [id number]   3/24/2025  9:00 AM Tadeo Crews MD [facility] [place]         Forward to DELIA Law please advise on refills. Thanks.

## 2024-12-03 DIAGNOSIS — K59.09 CHRONIC CONSTIPATION: ICD-10-CM

## 2024-12-04 RX ORDER — PLECANATIDE 3 MG/1
1 TABLET ORAL DAILY
Qty: 30 TABLET | Refills: 0 | Status: SHIPPED | OUTPATIENT
Start: 2024-12-04

## 2024-12-04 RX ORDER — PLECANATIDE 3 MG/1
1 TABLET ORAL DAILY
Qty: 30 TABLET | Refills: 0 | OUTPATIENT
Start: 2024-12-04

## 2024-12-04 NOTE — TELEPHONE ENCOUNTER
E-Prescribing Status: Receipt confirmed by pharmacy (12/4/2024  6:48 AM CST); duplicate rx refill request; request denied.   
oriented to person, place, time and situation

## 2025-03-05 DIAGNOSIS — K59.09 CHRONIC CONSTIPATION: ICD-10-CM

## 2025-03-06 NOTE — TELEPHONE ENCOUNTER
Routing to provider per protocol.   TRULANCE 3 MG Oral Tab   Last refilled on 12/4/24 for #30  with 0 rf.   Last labs 9/23/24.   Last seen on 9/23/24.       Future Appointments   Date Time Provider Department Center   3/24/2025  9:00 AM Tadeo Crews MD [facility] [place]   4/1/2025  4:00 PM Minnie Banks APRN [facility] [id number]          Thank you.

## 2025-03-07 RX ORDER — PLECANATIDE 3 MG/1
1 TABLET ORAL DAILY
Qty: 30 TABLET | Refills: 0 | Status: SHIPPED | OUTPATIENT
Start: 2025-03-07

## 2025-04-11 DIAGNOSIS — K59.09 CHRONIC CONSTIPATION: ICD-10-CM

## 2025-04-11 NOTE — TELEPHONE ENCOUNTER
Is Simi still following with gastroenterology?    Dr. Oconnell?  Recommending follow-up with gastro team to confirm trulance is still appropraite

## 2025-04-11 NOTE — TELEPHONE ENCOUNTER
Medication(s) to Refill:   Requested Prescriptions     Pending Prescriptions Disp Refills    TRULANCE 3 MG Oral Tab [Pharmacy Med Name: TRULANCE 3MG TABLETS] 30 tablet 0     Sig: TAKE 1 TABLET BY MOUTH DAILY       Last Office Visit with PCP: 9/23/2024 w/nidhi/aaron 9/23/2024     Last PHQ:  PHQ-2 SCORE: (Patient-Rptd) 2  , done 4/1/2025   Little interest or pleasure in doing things: (Patient-Rptd) 1    Feeling down, depressed, or hopeless: (Patient-Rptd) 1          Future Appointments:  Future Appointments   Date Time Provider Department Center   6/10/2025 11:00 AM Minnie Banks APRN [facility] [id number]       Last Blood Pressures:  BP Readings from Last 3 Encounters:   09/23/24 122/78   07/17/24 118/72   05/06/24 138/80       Recent Labs:    Lab Results   Component Value Date    BUN 12 09/23/2024    CREATSERUM 0.97 09/23/2024    GFR 80 08/08/2017    AST 22 09/23/2024    ALT 16 09/23/2024     09/23/2024    K 4.6 09/23/2024       Lab Results   Component Value Date    GLU 90 09/23/2024     12/22/2023    A1C 5.6 12/22/2023       Lab Results   Component Value Date    TRIG 68 09/23/2024     (H) 09/23/2024    HDL 48 09/23/2024       Lab Results   Component Value Date    T4F 1.4 09/23/2024    TSH 2.526 09/23/2024       Lab Results   Component Value Date    GLU 90 09/23/2024    BUN 12 09/23/2024    BUNCREA 10.2 09/15/2020    CREATSERUM 0.97 09/23/2024    ANIONGAP 4 09/23/2024    GFR 80 08/08/2017    GFRNAA 73 06/16/2022    GFRAA 84 06/16/2022    CA 11.1 (H) 09/23/2024    OSMOCALC 291 09/23/2024    ALKPHO 80 09/23/2024    AST 22 09/23/2024    ALT 16 09/23/2024    BILT 0.8 09/23/2024    TP 7.2 09/23/2024    ALB 4.3 09/23/2024    GLOBULIN 2.9 09/23/2024     09/23/2024    K 4.6 09/23/2024     09/23/2024    CO2 30.0 09/23/2024       Lab Results   Component Value Date    CHOLEST 166 09/23/2024    TRIG 68 09/23/2024    HDL 48 09/23/2024     (H) 09/23/2024    VLDL 11 09/23/2024    TCHDLRATIO  3.2 08/15/2014    [facility] 118 09/23/2024

## 2025-04-12 RX ORDER — PLECANATIDE 3 MG/1
1 TABLET ORAL DAILY
Qty: 30 TABLET | Refills: 0 | OUTPATIENT
Start: 2025-04-12

## 2025-04-12 NOTE — TELEPHONE ENCOUNTER
Pt states she still sees Dr Oconnell, she will follow up with  the Trulance.     Pt is also out of Statin has not taken it in a few months. Was told she needs an appointment with Dr Jones, but pt does not have a schedule to work with Robert. She can not do weekends as she is in [place] for the weekend. PT also states she is retiring soon, but would like to keep Rosalva as her primary Care Please advise on what to do in regards to statin.

## 2025-05-21 ENCOUNTER — TELEPHONE (OUTPATIENT)
Dept: FAMILY MEDICINE CLINIC | Facility: CLINIC | Age: 60
End: 2025-05-21

## 2025-05-21 NOTE — TELEPHONE ENCOUNTER
Fax received requesting order for CPAP and supplies  Simi is overdue for sleep study and is following with pulmonology    Recommending Simi sets up appointment with pulmonlogist, Tadeo Reyes    Sleep study ordered 10/03/2023, 11/17/2023

## 2025-05-21 NOTE — TELEPHONE ENCOUNTER
Called pt. Left message of below recommendation to schedule appt with pulmonologist. Pt to call back with further questions.

## 2025-05-31 ENCOUNTER — MED REC SCAN ONLY (OUTPATIENT)
Dept: FAMILY MEDICINE CLINIC | Facility: CLINIC | Age: 60
End: 2025-05-31

## 2025-06-16 ENCOUNTER — TELEPHONE (OUTPATIENT)
Dept: FAMILY MEDICINE CLINIC | Facility: CLINIC | Age: 60
End: 2025-06-16

## 2025-06-17 ENCOUNTER — TELEMEDICINE (OUTPATIENT)
Dept: FAMILY MEDICINE CLINIC | Facility: CLINIC | Age: 60
End: 2025-06-17
Payer: COMMERCIAL

## 2025-06-17 DIAGNOSIS — E83.52 SERUM CALCIUM ELEVATED: ICD-10-CM

## 2025-06-17 DIAGNOSIS — E55.9 VITAMIN D INSUFFICIENCY: ICD-10-CM

## 2025-06-17 DIAGNOSIS — K21.9 GASTROESOPHAGEAL REFLUX DISEASE WITHOUT ESOPHAGITIS: ICD-10-CM

## 2025-06-17 DIAGNOSIS — E78.2 MIXED HYPERLIPIDEMIA: ICD-10-CM

## 2025-06-17 DIAGNOSIS — M35.00 SJOGREN'S SYNDROME WITHOUT EXTRAGLANDULAR INVOLVEMENT (HCC): ICD-10-CM

## 2025-06-17 DIAGNOSIS — R42 EPISODE OF DIZZINESS: ICD-10-CM

## 2025-06-17 DIAGNOSIS — R79.89 ELEVATED TSH: Primary | ICD-10-CM

## 2025-06-17 DIAGNOSIS — R53.82 CHRONIC FATIGUE: ICD-10-CM

## 2025-06-17 DIAGNOSIS — Z80.3 FAMILY HISTORY OF BREAST CANCER: ICD-10-CM

## 2025-06-17 DIAGNOSIS — R93.1 ELEVATED CORONARY ARTERY CALCIUM SCORE: ICD-10-CM

## 2025-06-17 DIAGNOSIS — R92.323 MAMMOGRAPHIC FIBROGLANDULAR DENSITY, BILATERAL BREASTS: ICD-10-CM

## 2025-06-17 DIAGNOSIS — E66.811 OBESITY (BMI 30.0-34.9): ICD-10-CM

## 2025-06-17 DIAGNOSIS — E61.1 IRON DEFICIENCY: ICD-10-CM

## 2025-06-17 DIAGNOSIS — Z91.030 ALLERGY TO BEE STING: ICD-10-CM

## 2025-06-17 DIAGNOSIS — Z12.31 ENCOUNTER FOR SCREENING MAMMOGRAM FOR MALIGNANT NEOPLASM OF BREAST: ICD-10-CM

## 2025-06-17 PROCEDURE — 98007 SYNCH AUDIO-VIDEO EST HI 40: CPT | Performed by: NURSE PRACTITIONER

## 2025-06-17 RX ORDER — ATORVASTATIN CALCIUM 10 MG/1
10 TABLET, FILM COATED ORAL NIGHTLY
Qty: 90 TABLET | Refills: 2 | Status: SHIPPED | OUTPATIENT
Start: 2025-06-17

## 2025-06-17 RX ORDER — EPINEPHRINE 0.3 MG/.3ML
0.3 INJECTION SUBCUTANEOUS AS NEEDED
Qty: 1 EACH | Refills: 0 | Status: SHIPPED | OUTPATIENT
Start: 2025-06-17 | End: 2026-06-17

## 2025-06-17 NOTE — PROGRESS NOTES
VIDEO VISIT    CHIEF COMPLAINT:  Lab work    HISTORY OF PRESENT ILLNESS:    This is a 59 year old female who verbally consents to a video visit today, June 17, 2025 for an update on various health concerns and requests for lab orders.    Simi recently saw endocrinology, discovered her cortisol was slightly elevated and TSH slightly elevated.  Plans to be completing dexamethasone suppression test.  Would like additional labs drawn before insurance coverage ends at the end of July.      Intermittent episodes of dizziness and chronic fatigue.  Dizziness resolves with drinking orange juice.  Denies family history of diabetes.  History of iron deficiency, agreeable to getting labs drawn to assess iron studies as well as vitamin levels due to chronic fatigue and history of GERD.  Previous lab work has shown elevated calcium level, patient would like to follow-up on this as well as she has not completed previous labs.    We discussed testing for possible insect sting allergies.  Would not change treatment plan at this point.  Testing deferred per shared decision making.  Would like refill on epi pen due to bee sting allergy.    Simi has a history of Sjogren's, she is currently seeing [facility]; lung nodules, plans to reschedule appointment with pulmonology; ADHD, currently follows with Kathryn who is prescribing vyvanse 60mg and adderall 10mg.  She also has a family history of breast cancer, some breast density.  Mammogram is due in the near future, we will place routine mammogram and breast MRI order due to history of breast density and family history of breast cancer.    Patient requests labs be faxed to [facility] in [place]  Patient verbalizes understanding to schedule with Dr. Ruano with September for annual physical  Stopped taking multivitamin 2 months    ALLERGIES:  Allergies[1]    CURRENT MEDICATIONS:  Current Medications[2]    MEDICAL HISTORY:  Past Medical History[3]  Past Surgical History[4]  Family  History[5]  Family Status   Relation Status    Mo Alive    Fa     Sis Alive    MGMA     PGMA     Lindsay Alive    Son Alive    Mat Aunt     Pat Aunt (Not Specified)    Pat Cous Fem Alive    Pat Cous Fem Alive    Pat Cous Fem Alive     Short Social Hx on File[6]    ROS:    GENERAL:  Denies fever   RESPIRATORY:  Denies difficulty breathing  CARDIO:  Denies chest pain with exertion    VITALS:  No vitals were obtained by clinical staff during this visit.      PHYSICAL EXAM:    Physical exam is limited due to video visit.    Simi Beasley serves as a reliable historian.  Speech is clear and organized without difficulty speaking in full sentences.    No shortness of breath or cough noted during our conversation.    ASSESSMENT & PLAN:    1. Elevated TSH  Following with endocrinology  Endocrinology - Tammie Dominguez  Upcoming DST    2. Serum calcium elevated  - Comp Metabolic Panel (14); Future    3. Mixed hyperlipidemia  Has stopped taking atorvastatin, difficulty with getting refills  Reinforced importance of this medication  Refills provided  Lab orders placed  Follow up in September for annual physical  - atorvastatin 10 MG Oral Tab; Take 1 tablet (10 mg total) by mouth nightly.  Dispense: 90 tablet; Refill: 2  - Lipid Panel; Future  - Hemoglobin A1C [E]; Future    4. Elevated coronary artery calcium score  Has stopped taking atorvastatin, difficulty with getting refills  Reinforced importance of this medication  Refills provided  Lab orders placed  Follow up in September for annual physical  - atorvastatin 10 MG Oral Tab; Take 1 tablet (10 mg total) by mouth nightly.  Dispense: 90 tablet; Refill: 2  - Lipid Panel; Future    5. Sjogren's syndrome without extraglandular involvement (HCC)  Following with [facility]    6. Vitamin D insufficiency  - Vitamin D [E]; Future    7. Chronic fatigue  - Ferritin; Future  - Iron And Tibc; Future  - Vitamin D [E]; Future  - Vitamin B12 [E];  Future  - CBC With Differential With Platelet; Future    8. Gastroesophageal reflux disease without esophagitis  Following with Gastroenterology  Gastroenterology - Dr. Charles Oconnell  - CBC With Differential With Platelet; Future  - Comp Metabolic Panel (14); Future  - Vitamin D [E]; Future  - Vitamin B12 [E]; Future    Vitamin b12 is low - ddx dietary related, celiac, IBD, pancreatic insufficiency    9. Episode of dizziness  Resolved with use of orange juice  - CBC With Differential With Platelet; Future  - Comp Metabolic Panel (14); Future  - Ferritin; Future  - Iron And Tibc; Future  - Hemoglobin A1C [E]; Future    10. Iron deficiency  - CBC With Differential With Platelet; Future  - Ferritin; Future  - Iron And Tibc; Future    11. Obesity (BMI 30.0-34.9)  - Hemoglobin A1C [E]; Future    12. Allergy to bee sting  - EPINEPHrine (EPIPEN 2-LAURYN) 0.3 MG/0.3ML Injection Solution Auto-injector; Inject 0.3 mL (1 each total) as directed as needed (for first signs of a severe allergic reaction/anaphylaxis). Seek emergency medical attention after any use.  Dispense: 1 each; Refill: 0    13. Family history of breast cancer  - [facility] RUDI 2D+3D SCREENING BILAT (CPT=77067/58252); Future  - MRI BREAST SCREENING (W+WO) W/CAD BILAT (CPT=77049); Future    14. Mammographic fibroglandular density, bilateral breasts  Per mammogram 2023  - [facility] RUDI 2D+3D SCREENING BILAT (CPT=77067/58108); Future  - MRI BREAST SCREENING (W+WO) W/CAD BILAT (CPT=77049); Future    15. Encounter for screening mammogram for malignant neoplasm of breast  - [facility] RUDI 2D+3D SCREENING BILAT (CPT=77067/25058); Future  - MRI BREAST SCREENING (W+WO) W/CAD BILAT (CPT=77049); Future         [1]   Allergies  Allergen Reactions    Bee Venom SWELLING    Bees SWELLING    [place] OTHER (SEE COMMENTS)     Allergy testing    Shrimp OTHER (SEE COMMENTS)     Allergy Testing   [2]   Current Outpatient Medications   Medication Sig Dispense Refill    Lisdexamfetamine Dimesylate  (VYVANSE) 60 MG Oral Cap Take 1 capsule (60 mg total) by mouth daily. 30 capsule 0    amphetamine-dextroamphetamine (ADDERALL) 10 MG Oral Tab Take 1 tablet (10 mg total) by mouth daily. As a booster dose. 30 tablet 0    buPROPion ER (WELLBUTRIN XL) 150 MG Oral Tablet 24 Hr Take 1 tablet (150 mg total) by mouth every morning. 90 tablet 0    TRULANCE 3 MG Oral Tab Take 1 tablet by mouth daily. 30 tablet 0    Lisdexamfetamine Dimesylate (VYVANSE) 60 MG Oral Cap Take 1 capsule (60 mg total) by mouth every morning. 30 capsule 0    atorvastatin 10 MG Oral Tab Take 1 tablet (10 mg total) by mouth nightly. 90 tablet 2    famotidine 40 MG Oral Tab Take 1 tablet (40 mg total) by mouth 2 (two) times daily.      Brimonidine Tartrate (LUMIFY) 0.025 % Ophthalmic Solution Apply 1 Application to eye As Directed.      EPINEPHrine 0.3 MG/0.3ML Injection Solution Auto-injector Inject 0.3 mL (1 each total) into the muscle as needed. 1 each 1    ibuprofen (ADVIL) 200 MG Oral Tab Take 1 tablet (200 mg total) by mouth in the morning and 1 tablet (200 mg total) before bedtime.      fluticasone propionate 50 MCG/ACT Nasal Suspension 2 sprays by Each Nare route daily. 1 each 0    multivitamin Oral Tab Take 1 tablet by mouth daily. (Patient not taking: Reported on 4/1/2025)     [3]   Past Medical History:   ALCOHOL USE    Allergic rhinitis    Anxiety    GERD (gastroesophageal reflux disease)    Hyperlipidemia    Migraines    Obesity    Pneumonia due to organism    Sleep apnea    Thyroid disease   [4]   Past Surgical History:  Procedure Laterality Date    Cataract      Colectomy      Colonoscopy N/A 10/09/2017    Procedure: COLONOSCOPY, POSSIBLE BIOPSY, POSSIBLE POLYPECTOMY 96787;  Surgeon: Cyrus Kunz MD;  Location: [facility]    Colonoscopy N/A 10/11/2017    Procedure: COLONOSCOPY, POSSIBLE BIOPSY, POSSIBLE POLYPECTOMY 59283;  Surgeon: Cyrus Kunz MD;  Location: [facility]    Colonoscopy,diagnostic N/A 06/05/2015    Procedure:  COLONOSCOPY, POSSIBLE BIOPSY, POSSIBLE POLYPECTOMY 43432;  Surgeon: Cyrus Kunz MD;  Location: [facility]    Hemorrhoidectomy      Hernia surgery            Other surgical history      colonoscopy    Other surgical history   ()    colon resection--\"intestine was nicked\" during initial surgery, had to go back in to repair a few days later    Other surgical history      hernia repair with mesh (a few years after colon resection surgeries)   [5]   Family History  Problem Relation Age of Onset    Other (Other) Mother         RA    Prostate Cancer Father     Other (Other) Sister         RA    Breast Cancer Maternal Grandmother 20        estimate    Breast Cancer Paternal Grandmother 60    Cancer Maternal Aunt         great aunt breast cancer    Breast Cancer Maternal Aunt 60    Cancer Paternal Aunt         lupectomy ? breast CA?    Breast Cancer Paternal Cousin Female 35    Breast Cancer Paternal Cousin Female 30    Breast Cancer Paternal Cousin Female 40        estimates   [6]   Social History  Socioeconomic History    Marital status:    Occupational History     Employer: [employer]     Comment:    Tobacco Use    Smoking status: Former     Current packs/day: 0.00     Average packs/day: 0.3 packs/day for 1 year (0.3 ttl pk-yrs)     Types: Cigarettes     Start date: 2008     Quit date: 2009     Years since quittin.4    Smokeless tobacco: Never    Tobacco comments:     non-smoker   Vaping Use    Vaping status: Never Used   Substance and Sexual Activity    Alcohol use: Not Currently     Alcohol/week: 5.0 - 7.0 standard drinks of alcohol     Types: 5 - 7 Glasses of wine per week    Drug use: No     Social Drivers of Health      Received from [facility]    Housing Stability

## 2025-06-19 ENCOUNTER — PATIENT MESSAGE (OUTPATIENT)
Dept: FAMILY MEDICINE CLINIC | Facility: CLINIC | Age: 60
End: 2025-06-19

## 2025-06-19 DIAGNOSIS — E53.8 VITAMIN B12 DEFICIENCY: Primary | ICD-10-CM

## 2025-06-19 DIAGNOSIS — E61.1 IRON DEFICIENCY: ICD-10-CM

## 2025-06-19 DIAGNOSIS — E78.00 ELEVATED LDL CHOLESTEROL LEVEL: ICD-10-CM

## 2025-06-19 DIAGNOSIS — E55.9 VITAMIN D INSUFFICIENCY: ICD-10-CM

## 2025-06-19 NOTE — TELEPHONE ENCOUNTER
Please see patient's MCM    Care Everywhere updated    Results in Epic    Please review  Please advise, thank you

## 2025-06-20 PROBLEM — E78.00 ELEVATED LDL CHOLESTEROL LEVEL: Status: ACTIVE | Noted: 2025-06-20

## 2025-06-20 PROBLEM — E53.8 VITAMIN B12 DEFICIENCY: Status: ACTIVE | Noted: 2025-06-20

## 2025-06-20 RX ORDER — MELATONIN
1000 DAILY
Qty: 90 TABLET | Refills: 0 | COMMUNITY
Start: 2025-06-20

## 2025-06-20 RX ORDER — CHOLECALCIFEROL (VITAMIN D3) 25 MCG
800 CAPSULE ORAL DAILY
Qty: 90 CAPSULE | Refills: 0 | COMMUNITY
Start: 2025-06-20

## 2025-06-20 RX ORDER — FERROUS SULFATE 325(65) MG
325 TABLET ORAL
Qty: 90 TABLET | Refills: 0 | COMMUNITY
Start: 2025-06-20

## 2025-06-20 NOTE — TELEPHONE ENCOUNTER
TSH - improved  T4 - remains stable  Cortisol - normal  A1C - does not fall within prediabetic/diabetic range    Vit B12 - low - recommending follow-up with GI team for possible workup regarding celiac disease, inflammatory bowel disease, or pancreatic insufficiency - begin over the counter vitamin b12 1000mcg daily - recheck in 6-8 weeks    Vit D - low - begin over the counter vitamin d 800iu daily  Iron studies - stable, iron on the lower end - would probably benefit from iron supplement (ferrous sulfate 325/65mg three days a week)    LDL - elevated, restart atorvastatin and recheck in 6-8 weeks    CMP - protein is slightly low, prioritize produce and lean protein in your diet - whole foods     CBC - stable

## 2025-06-24 ENCOUNTER — TELEPHONE (OUTPATIENT)
Facility: CLINIC | Age: 60
End: 2025-06-24

## 2025-06-24 DIAGNOSIS — R91.8 PULMONARY NODULES: Primary | ICD-10-CM

## 2025-07-17 ENCOUNTER — HOSPITAL ENCOUNTER (OUTPATIENT)
Dept: CT IMAGING | Age: 60
Discharge: HOME | End: 2025-07-17
Attending: INTERNAL MEDICINE
Payer: COMMERCIAL

## 2025-07-17 ENCOUNTER — HOSPITAL ENCOUNTER (OUTPATIENT)
Dept: MAMMOGRAPHY | Age: 60
Discharge: HOME | End: 2025-07-17
Attending: NURSE PRACTITIONER
Payer: COMMERCIAL

## 2025-07-17 ENCOUNTER — PATIENT MESSAGE (OUTPATIENT)
Dept: FAMILY MEDICINE CLINIC | Facility: CLINIC | Age: 60
End: 2025-07-17

## 2025-07-17 DIAGNOSIS — R91.8 PULMONARY NODULES: ICD-10-CM

## 2025-07-17 DIAGNOSIS — Z80.3 FAMILY HISTORY OF BREAST CANCER: ICD-10-CM

## 2025-07-17 DIAGNOSIS — Z12.31 ENCOUNTER FOR SCREENING MAMMOGRAM FOR MALIGNANT NEOPLASM OF BREAST: ICD-10-CM

## 2025-07-17 DIAGNOSIS — R92.323 MAMMOGRAPHIC FIBROGLANDULAR DENSITY, BILATERAL BREASTS: ICD-10-CM

## 2025-07-17 PROCEDURE — 77067 SCR MAMMO BI INCL CAD: CPT | Performed by: NURSE PRACTITIONER

## 2025-07-17 PROCEDURE — 71250 CT THORAX DX C-: CPT | Performed by: INTERNAL MEDICINE

## 2025-07-17 PROCEDURE — 77063 BREAST TOMOSYNTHESIS BI: CPT | Performed by: NURSE PRACTITIONER

## 2025-07-18 ENCOUNTER — MED REC SCAN ONLY (OUTPATIENT)
Facility: CLINIC | Age: 60
End: 2025-07-18

## (undated) DIAGNOSIS — R05.9 COUGH: Primary | ICD-10-CM

## (undated) NOTE — MR AVS SNAPSHOT
After Visit Summary   9/15/2020    Fish Lowe    MRN: [id number]           Visit Information     Date & Time  9/15/2020  3:30 PM Provider  Stephen Reyes MD Department  [place] [address], [facility] Dept.  Phone  [phone] ASSAY, THYROID STIM HORMONE [[id number] CUSTOM]     C-REACTIVE PROTEIN [[id number] CUSTOM]     CBC W/ DIFFERENTIAL [[id number] CUSTOM]     CBC WITH DIFFERENTIAL WITH PLATELET [[id number] CUSTOM]     COMP METABOLIC PANEL (14) [7560017 CUSTOM]     CYCLIC CITRULLINATE CYCLIC CITRULLINATE PEP.  IGG [[id number] CUSTOM]  9/15/2020 (Approximate) 9/15/2021    F001 EGG WHITE [[id number] CUSTOM]  9/15/2020 (Approximate) 9/15/2021    F002 MILK (COW) [[id number] CUSTOM]  9/15/2020 (Approximate) 9/15/2021    F020 ALMOND [[id number] CUSTOM] If you do not have a [employer] Account, or if you prefer to speak with someone to schedule your appointment, please call Nisha Mas Scheduling at [phone].                  Educational Information    Healthy Diet and Regular Exercise  [facility] If you receive a survey from [facility] [facility], please take a few minutes to complete it and provide feedback. We strive to deliver the best patient experience and are looking for ways to make improvements. Your feedback will help us do so.  For more information EMERGENCY ROOM Life-threatening emergencies needing immediate intervention at a hospital emergency room.  Average cost  $2,300*   *Cost varies based on your insurance coverage  For more information about hours, locations or appointment options available at

## (undated) NOTE — LETTER
[address] [address]           Dear Adela Doyle records indicate that you have outstanding lab work and or testing that was ordered for you and has not yet been completed:  Lab Frequency Next Occurrence   FLORES SCREENING BILAT (CPT=77067) Once 06/02/2022      To provide you with the best possible care, please complete these orders at your earliest convenience. If you have recently completed these orders please disregard this letter. If you have any questions please call the office at [phone].  Please call Central Scheduling at [phone] to schedule the above test.    Thank you,     [facility]

## (undated) NOTE — Clinical Note
Hernánmartha Dykes saw [facility] in the office today she presents with new onset GERD and a history of colon polyps. I am planning EGD and colonoscopy.   Thank you Robina Hannon